# Patient Record
Sex: FEMALE | URBAN - METROPOLITAN AREA
[De-identification: names, ages, dates, MRNs, and addresses within clinical notes are randomized per-mention and may not be internally consistent; named-entity substitution may affect disease eponyms.]

---

## 2017-08-03 ENCOUNTER — INPATIENT (INPATIENT)
Facility: HOSPITAL | Age: 82
LOS: 0 days | Discharge: HOME | End: 2017-08-04
Attending: FAMILY MEDICINE | Admitting: FAMILY MEDICINE
Payer: MEDICARE

## 2017-08-03 VITALS — HEIGHT: 63 IN | WEIGHT: 134.92 LBS

## 2017-08-03 DIAGNOSIS — K27.9 PEPTIC ULCER, SITE UNSPECIFIED, UNSPECIFIED AS ACUTE OR CHRONIC, WITHOUT HEMORRHAGE OR PERFORATION: ICD-10-CM

## 2017-08-03 DIAGNOSIS — K92.2 GASTROINTESTINAL HEMORRHAGE, UNSPECIFIED: ICD-10-CM

## 2017-08-03 DIAGNOSIS — H40.9 UNSPECIFIED GLAUCOMA: ICD-10-CM

## 2017-08-03 DIAGNOSIS — N63 UNSPECIFIED LUMP IN BREAST: Chronic | ICD-10-CM

## 2017-08-03 DIAGNOSIS — N81.10 CYSTOCELE, UNSPECIFIED: ICD-10-CM

## 2017-08-03 DIAGNOSIS — Z98.890 OTHER SPECIFIED POSTPROCEDURAL STATES: Chronic | ICD-10-CM

## 2017-08-03 PROBLEM — Z00.00 ENCOUNTER FOR PREVENTIVE HEALTH EXAMINATION: Status: ACTIVE | Noted: 2017-08-03

## 2017-08-03 LAB
ABO RH CONFIRMATION: SIGNIFICANT CHANGE UP
ALBUMIN SERPL ELPH-MCNC: 3.1 G/DL — LOW (ref 3.3–5)
ALP SERPL-CCNC: 76 U/L — SIGNIFICANT CHANGE UP (ref 40–120)
ALT FLD-CCNC: 20 U/L — SIGNIFICANT CHANGE UP (ref 12–78)
ANION GAP SERPL CALC-SCNC: 7 MMOL/L — SIGNIFICANT CHANGE UP (ref 5–17)
ANISOCYTOSIS BLD QL: SLIGHT — SIGNIFICANT CHANGE UP
APPEARANCE UR: CLEAR — SIGNIFICANT CHANGE UP
AST SERPL-CCNC: 31 U/L — SIGNIFICANT CHANGE UP (ref 15–37)
BASOPHILS # BLD AUTO: 0.1 K/UL — SIGNIFICANT CHANGE UP (ref 0–0.2)
BASOPHILS NFR BLD AUTO: 2.5 % — HIGH (ref 0–2)
BILIRUB SERPL-MCNC: 0.5 MG/DL — SIGNIFICANT CHANGE UP (ref 0.2–1.2)
BILIRUB UR-MCNC: NEGATIVE — SIGNIFICANT CHANGE UP
BLD GP AB SCN SERPL QL: SIGNIFICANT CHANGE UP
BUN SERPL-MCNC: 19 MG/DL — SIGNIFICANT CHANGE UP (ref 7–23)
CALCIUM SERPL-MCNC: 8.9 MG/DL — SIGNIFICANT CHANGE UP (ref 8.5–10.1)
CHLORIDE SERPL-SCNC: 113 MMOL/L — HIGH (ref 96–108)
CO2 SERPL-SCNC: 23 MMOL/L — SIGNIFICANT CHANGE UP (ref 22–31)
COLOR SPEC: YELLOW — SIGNIFICANT CHANGE UP
CREAT SERPL-MCNC: 0.66 MG/DL — SIGNIFICANT CHANGE UP (ref 0.5–1.3)
DIFF PNL FLD: NEGATIVE — SIGNIFICANT CHANGE UP
EOSINOPHIL # BLD AUTO: 0.2 K/UL — SIGNIFICANT CHANGE UP (ref 0–0.5)
EOSINOPHIL NFR BLD AUTO: 4.2 % — SIGNIFICANT CHANGE UP (ref 0–6)
GLUCOSE SERPL-MCNC: 100 MG/DL — HIGH (ref 70–99)
GLUCOSE UR QL: NEGATIVE MG/DL — SIGNIFICANT CHANGE UP
HCT VFR BLD CALC: 31.4 % — LOW (ref 34.5–45)
HCT VFR BLD CALC: 32.1 % — LOW (ref 34.5–45)
HGB BLD-MCNC: 10.3 G/DL — LOW (ref 11.5–15.5)
HGB BLD-MCNC: 10.9 G/DL — LOW (ref 11.5–15.5)
INR BLD: 1.04 RATIO — SIGNIFICANT CHANGE UP (ref 0.88–1.16)
KETONES UR-MCNC: NEGATIVE — SIGNIFICANT CHANGE UP
LEUKOCYTE ESTERASE UR-ACNC: NEGATIVE — SIGNIFICANT CHANGE UP
LG PLATELETS BLD QL AUTO: SLIGHT — SIGNIFICANT CHANGE UP
LYMPHOCYTES # BLD AUTO: 1.9 K/UL — SIGNIFICANT CHANGE UP (ref 1–3.3)
LYMPHOCYTES # BLD AUTO: 39.6 % — SIGNIFICANT CHANGE UP (ref 13–44)
MAGNESIUM SERPL-MCNC: 2.1 MG/DL — SIGNIFICANT CHANGE UP (ref 1.6–2.6)
MANUAL DIF COMMENT BLD-IMP: SIGNIFICANT CHANGE UP
MCHC RBC-ENTMCNC: 24.4 PG — LOW (ref 27–34)
MCHC RBC-ENTMCNC: 32.7 GM/DL — SIGNIFICANT CHANGE UP (ref 32–36)
MCV RBC AUTO: 74.6 FL — LOW (ref 80–100)
MICROCYTES BLD QL: SLIGHT — SIGNIFICANT CHANGE UP
MONOCYTES # BLD AUTO: 0.5 K/UL — SIGNIFICANT CHANGE UP (ref 0–0.9)
MONOCYTES NFR BLD AUTO: 10.6 % — SIGNIFICANT CHANGE UP (ref 2–14)
NEUTROPHILS # BLD AUTO: 2.1 K/UL — SIGNIFICANT CHANGE UP (ref 1.8–7.4)
NEUTROPHILS NFR BLD AUTO: 43.2 % — SIGNIFICANT CHANGE UP (ref 43–77)
NITRITE UR-MCNC: NEGATIVE — SIGNIFICANT CHANGE UP
PH UR: 8 — SIGNIFICANT CHANGE UP (ref 5–8)
PLAT MORPH BLD: NORMAL — SIGNIFICANT CHANGE UP
PLATELET # BLD AUTO: 152 K/UL — SIGNIFICANT CHANGE UP (ref 150–400)
POTASSIUM SERPL-MCNC: 4.8 MMOL/L — SIGNIFICANT CHANGE UP (ref 3.5–5.3)
POTASSIUM SERPL-SCNC: 4.8 MMOL/L — SIGNIFICANT CHANGE UP (ref 3.5–5.3)
PROT SERPL-MCNC: 6.5 GM/DL — SIGNIFICANT CHANGE UP (ref 6–8.3)
PROT UR-MCNC: NEGATIVE MG/DL — SIGNIFICANT CHANGE UP
PROTHROM AB SERPL-ACNC: 11.2 SEC — SIGNIFICANT CHANGE UP (ref 9.8–12.7)
RBC # BLD: 4.22 M/UL — SIGNIFICANT CHANGE UP (ref 3.8–5.2)
RBC # FLD: 14.8 % — HIGH (ref 10.3–14.5)
RBC BLD AUTO: (no result)
SCHISTOCYTES BLD QL AUTO: SLIGHT — SIGNIFICANT CHANGE UP
SODIUM SERPL-SCNC: 143 MMOL/L — SIGNIFICANT CHANGE UP (ref 135–145)
SP GR SPEC: 1.01 — SIGNIFICANT CHANGE UP (ref 1.01–1.02)
SPHEROCYTES BLD QL SMEAR: SLIGHT — SIGNIFICANT CHANGE UP
TARGETS BLD QL SMEAR: SLIGHT — SIGNIFICANT CHANGE UP
TYPE + AB SCN PNL BLD: SIGNIFICANT CHANGE UP
UROBILINOGEN FLD QL: NEGATIVE MG/DL — SIGNIFICANT CHANGE UP
WBC # BLD: 4.9 K/UL — SIGNIFICANT CHANGE UP (ref 3.8–10.5)
WBC # FLD AUTO: 4.9 K/UL — SIGNIFICANT CHANGE UP (ref 3.8–10.5)

## 2017-08-03 PROCEDURE — 93010 ELECTROCARDIOGRAM REPORT: CPT

## 2017-08-03 PROCEDURE — 71010: CPT | Mod: 26

## 2017-08-03 PROCEDURE — 74177 CT ABD & PELVIS W/CONTRAST: CPT | Mod: 26

## 2017-08-03 PROCEDURE — 99285 EMERGENCY DEPT VISIT HI MDM: CPT

## 2017-08-03 RX ORDER — PANTOPRAZOLE SODIUM 20 MG/1
40 TABLET, DELAYED RELEASE ORAL
Qty: 0 | Refills: 0 | Status: DISCONTINUED | OUTPATIENT
Start: 2017-08-03 | End: 2017-08-04

## 2017-08-03 RX ORDER — DORZOLAMIDE HYDROCHLORIDE TIMOLOL MALEATE 20; 5 MG/ML; MG/ML
1 SOLUTION/ DROPS OPHTHALMIC
Qty: 0 | Refills: 0 | Status: DISCONTINUED | OUTPATIENT
Start: 2017-08-03 | End: 2017-08-04

## 2017-08-03 RX ORDER — ONDANSETRON 8 MG/1
4 TABLET, FILM COATED ORAL EVERY 6 HOURS
Qty: 0 | Refills: 0 | Status: DISCONTINUED | OUTPATIENT
Start: 2017-08-03 | End: 2017-08-04

## 2017-08-03 RX ORDER — ACETAMINOPHEN 500 MG
650 TABLET ORAL EVERY 6 HOURS
Qty: 0 | Refills: 0 | Status: DISCONTINUED | OUTPATIENT
Start: 2017-08-03 | End: 2017-08-04

## 2017-08-03 RX ORDER — AMLODIPINE BESYLATE 2.5 MG/1
5 TABLET ORAL DAILY
Qty: 0 | Refills: 0 | Status: DISCONTINUED | OUTPATIENT
Start: 2017-08-03 | End: 2017-08-04

## 2017-08-03 RX ORDER — PILOCARPINE HCL 4 %
1 DROPS OPHTHALMIC (EYE) THREE TIMES A DAY
Qty: 0 | Refills: 0 | Status: DISCONTINUED | OUTPATIENT
Start: 2017-08-03 | End: 2017-08-04

## 2017-08-03 RX ORDER — SODIUM CHLORIDE 9 MG/ML
1000 INJECTION INTRAMUSCULAR; INTRAVENOUS; SUBCUTANEOUS
Qty: 0 | Refills: 0 | Status: DISCONTINUED | OUTPATIENT
Start: 2017-08-03 | End: 2017-08-04

## 2017-08-03 RX ORDER — LATANOPROST 0.05 MG/ML
1 SOLUTION/ DROPS OPHTHALMIC; TOPICAL AT BEDTIME
Qty: 0 | Refills: 0 | Status: DISCONTINUED | OUTPATIENT
Start: 2017-08-03 | End: 2017-08-04

## 2017-08-03 RX ORDER — CALCIUM CARBONATE 500(1250)
1 TABLET ORAL AT BEDTIME
Qty: 0 | Refills: 0 | Status: DISCONTINUED | OUTPATIENT
Start: 2017-08-03 | End: 2017-08-04

## 2017-08-03 RX ADMIN — SODIUM CHLORIDE 75 MILLILITER(S): 9 INJECTION INTRAMUSCULAR; INTRAVENOUS; SUBCUTANEOUS at 18:02

## 2017-08-03 RX ADMIN — PANTOPRAZOLE SODIUM 40 MILLIGRAM(S): 20 TABLET, DELAYED RELEASE ORAL at 16:05

## 2017-08-03 RX ADMIN — PANTOPRAZOLE SODIUM 40 MILLIGRAM(S): 20 TABLET, DELAYED RELEASE ORAL at 18:02

## 2017-08-03 RX ADMIN — LATANOPROST 1 DROP(S): 0.05 SOLUTION/ DROPS OPHTHALMIC; TOPICAL at 21:00

## 2017-08-03 RX ADMIN — AMLODIPINE BESYLATE 5 MILLIGRAM(S): 2.5 TABLET ORAL at 15:55

## 2017-08-03 NOTE — ED PROVIDER NOTE - GASTROINTESTINAL NEGATIVE STATEMENT, MLM
+ abdominal pain, +blood in stool, no bloating, no constipation, no diarrhea, no nausea and no vomiting.

## 2017-08-03 NOTE — H&P ADULT - ASSESSMENT
81 yo female with PMH of remote PUD 35 years ago, Glaucoma, OP presents with 1 episode of of rectal bleeding this am.  Patient reports normal BM , but in during last week she has had more lose BM, but no melena, no blood, some nonspecific abdominal pain. She had a normal US abdomen 6 months ago.  As per patient she has had large amount of blood in her stool, about a  cup.  Denies prior  episodes like this, remote h/o colonoscopy and upper endoscopy. Denies use of antiplatelet, or anticoagulant medication, NSAIDs, but take OTC - fish oil, gingko biloba - which both can increase risk of bleeding .  Denies dizziness, lightheadedness, SOB, chest pain, weakness, fatigue, or any other symptoms, reports occasional dry cough at night, sinusitis.    In Ed - T(F): 97.7  98.1, HR: 60 (60 - 68), /77 ( 166/77 - 187/91), RR: 16 , SpO2: 99% (99% - 100%), Hb 10.3, UA - neg  CT abd - negative for acute pathology

## 2017-08-03 NOTE — H&P ADULT - PROBLEM SELECTOR PLAN 1
med- surg stable  diet, IV fluids  IV PPI  h/h q 8h monitoring  stop fish oil and Gingko biloba - both can increase risk of bleeding  GI consult for colonoscopy

## 2017-08-03 NOTE — ED PROVIDER NOTE - OBJECTIVE STATEMENT
83 yo F hx of peptic ulcer remotely, presents with CC of rectal bleeding.  Pt has had some nonspecific abdominal pain, over the last few months.  States had a normal US abdomen 6 months ago.  Was supposed to be schedule for colonoscopy with Dr. Verdin 4 months ago.  This morning had one episode of bright red blood per rectum, approximately a cup full according to her and daughter.  Denies previous occurrence of this.  Denies use of antiplatelet, or anticoagulant medication.  Denies dizziness, lightheadedness, SOB, chest pain, weakness, fatigue, or any other symptoms.  No other concerns.

## 2017-08-03 NOTE — H&P ADULT - NSHPLABSRESULTS_GEN_ALL_CORE
143  |  113<H>  |  19  ----------------------------<  100<H>  4.8   |  23  |  0.66    Ca    8.9      03 Aug 2017 08:34  Mg     2.1         TPro  6.5  /  Alb  3.1<L>  /  TBili  0.5  /  DBili  x   /  AST  31  /  ALT  20  /  AlkPhos  76                              10.3   4.9   )-----------( 152      ( 03 Aug 2017 08:34 )             31.4             LIVER FUNCTIONS - ( 03 Aug 2017 08:34 )  Alb: 3.1 g/dL / Pro: 6.5 gm/dL / ALK PHOS: 76 U/L / ALT: 20 U/L / AST: 31 U/L / GGT: x             PT/INR - ( 03 Aug 2017 08:34 )   PT: 11.2 sec;   INR: 1.04 ratio             Urinalysis Basic - ( 03 Aug 2017 10:11 )    Color: Yellow / Appearance: Clear / S.010 / pH: x  Gluc: x / Ketone: Negative  / Bili: Negative / Urobili: Negative mg/dL   Blood: x / Protein: Negative mg/dL / Nitrite: Negative   Leuk Esterase: Negative / RBC: x / WBC x   Sq Epi: x / Non Sq Epi: x / Bacteria: x    < from: CT Abdomen and Pelvis w/ IV Cont (17 @ 10:02) >    LOWER CHEST:Within normal limits.    LIVER: Within normal limits.  BILE DUCTS: Normal caliber.  GALLBLADDER: Within normal limits.  SPLEEN: Within normal limits.  PANCREAS: Within normal limits.  ADRENALS: Within normal limits.  KIDNEYS/URETERS: Within normal limits.    BLADDER: Within normal limits.  REPRODUCTIVE ORGANS: A pessary is noted in the vagina. No adnexal mass.    BOWEL: Colonic diverticulosis without evidence of acute diverticulitis.   No bowel obstruction. Appendix is not visualized. No secondary signs of   appendicitis.  PERITONEUM: No ascites.  VESSELS:  Within normal limits.  RETROPERITONEUM: No lymphadenopathy.    ABDOMINAL WALL: Within normal limits.  BONES: Within normal limits.    IMPRESSION: Etiology of the patient's abdominal pain is not evident.    < end of copied text >

## 2017-08-03 NOTE — CONSULT NOTE ADULT - ASSESSMENT
81yo female with rectal bleeding  differential includes hemorrhoids, diverticulosis, malignancy, colitis  serial cbc and monitor for recurrent bleeding  if no further bleeding and tolerates po, would plan for d/c and outpt colonoscopy within 1-2 weeks  if recurrent bleeding would plan for colonoscopy as inpt  d/w pt and family in detail

## 2017-08-03 NOTE — CONSULT NOTE ADULT - SUBJECTIVE AND OBJECTIVE BOX
Patient is a 82y old  Female who presents with a chief complaint of rectal bleeding (03 Aug 2017 15:04)      HPI:  81 yo female with PMH of remote PUD 35 years ago, Glaucoma, OP presents with 1 episode of of rectal bleeding this am.  Patient reports normal BM , but in during last week she has had more lose BM, but no melena, no blood, some nonspecific abdominal pain. She had a normal US abdomen 6 months ago.  As per patient she has had large amount of blood in her stool, about a  cup.  Denies prior  episodes like this, remote h/o colonoscopy and upper endoscopy. Denies use of antiplatelet, or anticoagulant medication, NSAIDs, but take OTC - fish oil, gingko biloba. Denies dizziness, lightheadedness, SOB, chest pain, weakness, fatigue, or any other symptoms, reports occasional dry cough at night, sinusitis.  Pt currently eating dinner without issue.  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 1000 milliLiter(s) (75 mL/Hr) IV Continuous <Continuous>  pantoprazole  Injectable 40 milliGRAM(s) IV Push two times a day  calcium carbonate 500 mG (Tums) Chewable 1 Tablet(s) Chew at bedtime  dorzolamide 2%/timolol 0.5% Ophthalmic Solution 1 Drop(s) Both EYES two times a day  latanoprost 0.005% Ophthalmic Solution 1 Drop(s) Both EYES at bedtime  pilocarpine 4% Solution 1 Drop(s) Both EYES three times a day  multivitamin 1 Tablet(s) Oral daily  amLODIPine   Tablet 5 milliGRAM(s) Oral daily    MEDICATIONS  (PRN):  acetaminophen   Tablet 650 milliGRAM(s) Oral every 6 hours PRN For Temp greater than 38 C (100.4 F), mild pain  ondansetron Injectable 4 milliGRAM(s) IV Push every 6 hours PRN Nausea    Allergies    No Known Allergies    SOCIAL HISTORY: lives at home, no sig etoh    FAMILY HISTORY:  Family history of heart disease (Mother)      REVIEW OF SYSTEMS:    CONSTITUTIONAL: No weakness, fevers or chills  EYES/ENT: No visual changes;  No vertigo or throat pain   NECK: No pain or stiffness  RESPIRATORY: No cough, wheezing, hemoptysis; No shortness of breath  CARDIOVASCULAR: No chest pain or palpitations  GASTROINTESTINAL: rectal bleeding as above  GENITOURINARY: No dysuria, frequency or hematuria  NEUROLOGICAL: No numbness or weakness  SKIN: No itching, burning, rashes, or lesions   PSYCH: Normal mood and affect  All other review of systems is negative unless indicated above.    Vital Signs Last 24 Hrs  T(C): 36.5 (03 Aug 2017 16:40), Max: 36.7 (03 Aug 2017 08:08)  T(F): 97.7 (03 Aug 2017 16:40), Max: 98.1 (03 Aug 2017 08:08)  HR: 56 (03 Aug 2017 16:40) (56 - 80)  BP: 195/73 (03 Aug 2017 16:40) (166/77 - 195/73)  BP(mean): --  RR: 16 (03 Aug 2017 16:40) (16 - 17)  SpO2: 100% (03 Aug 2017 16:40) (99% - 100%)    PHYSICAL EXAM:    Constitutional: NAD, well-developed  HEENT: MMM  Neck: No LAD  Respiratory: CTAB  Cardiovascular: S1 and S2, RRR, no M/G/R  Gastrointestinal: BS+, soft, NT/ND  Extremities: No peripheral edema  Vascular: 2+ peripheral pulses  Neurological: A/O x 3, no focal deficits  Psychiatric: Normal mood, normal affect  Skin: No rashes    LABS:                        10.9   x     )-----------( x        ( 03 Aug 2017 16:17 )             32.1     08-03    143  |  113<H>  |  19  ----------------------------<  100<H>  4.8   |  23  |  0.66    Ca    8.9      03 Aug 2017 08:34  Mg     2.1     08-03    TPro  6.5  /  Alb  3.1<L>  /  TBili  0.5  /  DBili  x   /  AST  31  /  ALT  20  /  AlkPhos  76  08-03    PT/INR - ( 03 Aug 2017 08:34 )   PT: 11.2 sec;   INR: 1.04 ratio           LIVER FUNCTIONS - ( 03 Aug 2017 08:34 )  Alb: 3.1 g/dL / Pro: 6.5 gm/dL / ALK PHOS: 76 U/L / ALT: 20 U/L / AST: 31 U/L / GGT: x             RADIOLOGY & ADDITIONAL STUDIES:

## 2017-08-03 NOTE — H&P ADULT - NSHPREVIEWOFSYSTEMS_GEN_ALL_CORE
REVIEW OF SYSTEMS:    CONSTITUTIONAL: No weakness, fevers or chills  EYES/ENT: No visual changes;  No vertigo or throat pain   NECK: No pain or stiffness  RESPIRATORY: No cough, wheezing, hemoptysis; No shortness of breath  CARDIOVASCULAR: No chest pain or palpitations  GASTROINTESTINAL: No abdominal or epigastric pain. No nausea, vomiting, or hematemesis; No diarrhea or constipation. No melena +  hematochezia.  GENITOURINARY: No dysuria, frequency or hematuria  NEUROLOGICAL: No numbness or weakness  SKIN: No itching, burning, rashes, or lesions   All other review of systems is negative unless indicated above.

## 2017-08-03 NOTE — ED ADULT NURSE NOTE - OBJECTIVE STATEMENT
Pt presents to ED c/o BRBPR noted in toilet. Pt denies SOB. Pt reports feeling "a little tired and lightheaded yesterday but it didn't stop me from doing what I have to do"

## 2017-08-03 NOTE — ED PROVIDER NOTE - GASTROINTESTINAL, MLM
Abdomen soft, mild TTP RLQ, no guarding.  Stool light brown, but guaiac positive Lot #[id number], 2 external hemorrhoids nonthrombosed nonbleeding.

## 2017-08-03 NOTE — ED PROVIDER NOTE - MEDICAL DECISION MAKING DETAILS
Stool brown, with red mixed, guaiac positive.  CT abdomen WNL.  Hgb 10.3.  Hemodynamically stable.  Spoke with Dr. Mae, agrees with admission, serial CBC's, and will see as inpatient for likely lower Gi bleed.

## 2017-08-04 ENCOUNTER — TRANSCRIPTION ENCOUNTER (OUTPATIENT)
Age: 82
End: 2017-08-04

## 2017-08-04 VITALS
OXYGEN SATURATION: 100 % | TEMPERATURE: 98 F | SYSTOLIC BLOOD PRESSURE: 134 MMHG | RESPIRATION RATE: 18 BRPM | DIASTOLIC BLOOD PRESSURE: 57 MMHG | HEART RATE: 64 BPM

## 2017-08-04 LAB
ANION GAP SERPL CALC-SCNC: 7 MMOL/L — SIGNIFICANT CHANGE UP (ref 5–17)
ANISOCYTOSIS BLD QL: SLIGHT — SIGNIFICANT CHANGE UP
BASOPHILS # BLD AUTO: 0.1 K/UL — SIGNIFICANT CHANGE UP (ref 0–0.2)
BASOPHILS NFR BLD AUTO: 2.7 % — HIGH (ref 0–2)
BUN SERPL-MCNC: 14 MG/DL — SIGNIFICANT CHANGE UP (ref 7–23)
CALCIUM SERPL-MCNC: 8.9 MG/DL — SIGNIFICANT CHANGE UP (ref 8.5–10.1)
CHLORIDE SERPL-SCNC: 112 MMOL/L — HIGH (ref 96–108)
CO2 SERPL-SCNC: 25 MMOL/L — SIGNIFICANT CHANGE UP (ref 22–31)
CREAT SERPL-MCNC: 0.49 MG/DL — LOW (ref 0.5–1.3)
EOSINOPHIL # BLD AUTO: 0.2 K/UL — SIGNIFICANT CHANGE UP (ref 0–0.5)
EOSINOPHIL NFR BLD AUTO: 4.6 % — SIGNIFICANT CHANGE UP (ref 0–6)
GLUCOSE SERPL-MCNC: 88 MG/DL — SIGNIFICANT CHANGE UP (ref 70–99)
HBA1C BLD-MCNC: 5.4 % — SIGNIFICANT CHANGE UP (ref 4–5.6)
HCT VFR BLD CALC: 31.2 % — LOW (ref 34.5–45)
HCT VFR BLD CALC: 31.4 % — LOW (ref 34.5–45)
HGB BLD-MCNC: 10.5 G/DL — LOW (ref 11.5–15.5)
HGB BLD-MCNC: 10.8 G/DL — LOW (ref 11.5–15.5)
LG PLATELETS BLD QL AUTO: SLIGHT — SIGNIFICANT CHANGE UP
LYMPHOCYTES # BLD AUTO: 2 K/UL — SIGNIFICANT CHANGE UP (ref 1–3.3)
LYMPHOCYTES # BLD AUTO: 41 % — SIGNIFICANT CHANGE UP (ref 13–44)
MAGNESIUM SERPL-MCNC: 2 MG/DL — SIGNIFICANT CHANGE UP (ref 1.6–2.6)
MANUAL DIF COMMENT BLD-IMP: SIGNIFICANT CHANGE UP
MCHC RBC-ENTMCNC: 25.3 PG — LOW (ref 27–34)
MCHC RBC-ENTMCNC: 33.6 GM/DL — SIGNIFICANT CHANGE UP (ref 32–36)
MCV RBC AUTO: 75.1 FL — LOW (ref 80–100)
MICROCYTES BLD QL: SLIGHT — SIGNIFICANT CHANGE UP
MONOCYTES # BLD AUTO: 0.4 K/UL — SIGNIFICANT CHANGE UP (ref 0–0.9)
MONOCYTES NFR BLD AUTO: 8.9 % — SIGNIFICANT CHANGE UP (ref 2–14)
NEUTROPHILS # BLD AUTO: 2.1 K/UL — SIGNIFICANT CHANGE UP (ref 1.8–7.4)
NEUTROPHILS NFR BLD AUTO: 42.9 % — LOW (ref 43–77)
PHOSPHATE SERPL-MCNC: 3.1 MG/DL — SIGNIFICANT CHANGE UP (ref 2.5–4.5)
PLAT MORPH BLD: NORMAL — SIGNIFICANT CHANGE UP
PLATELET # BLD AUTO: 151 K/UL — SIGNIFICANT CHANGE UP (ref 150–400)
PLATELET COUNT - ESTIMATE: NORMAL — SIGNIFICANT CHANGE UP
POTASSIUM SERPL-MCNC: 3.8 MMOL/L — SIGNIFICANT CHANGE UP (ref 3.5–5.3)
POTASSIUM SERPL-SCNC: 3.8 MMOL/L — SIGNIFICANT CHANGE UP (ref 3.5–5.3)
RBC # BLD: 4.15 M/UL — SIGNIFICANT CHANGE UP (ref 3.8–5.2)
RBC # FLD: 14.9 % — HIGH (ref 10.3–14.5)
RBC BLD AUTO: (no result)
SODIUM SERPL-SCNC: 144 MMOL/L — SIGNIFICANT CHANGE UP (ref 135–145)
TARGETS BLD QL SMEAR: SLIGHT — SIGNIFICANT CHANGE UP
TSH SERPL-MCNC: 1.97 UU/ML — SIGNIFICANT CHANGE UP (ref 0.36–3.74)
WBC # BLD: 4.9 K/UL — SIGNIFICANT CHANGE UP (ref 3.8–10.5)
WBC # FLD AUTO: 4.9 K/UL — SIGNIFICANT CHANGE UP (ref 3.8–10.5)

## 2017-08-04 RX ORDER — AMLODIPINE BESYLATE 2.5 MG/1
1 TABLET ORAL
Qty: 30 | Refills: 0 | OUTPATIENT
Start: 2017-08-04 | End: 2017-09-03

## 2017-08-04 RX ORDER — OMEGA-3 ACID ETHYL ESTERS 1 G
0 CAPSULE ORAL
Qty: 0 | Refills: 0 | COMMUNITY

## 2017-08-04 RX ORDER — ACETAMINOPHEN 500 MG
2 TABLET ORAL
Qty: 0 | Refills: 0 | COMMUNITY
Start: 2017-08-04

## 2017-08-04 RX ORDER — PANTOPRAZOLE SODIUM 20 MG/1
1 TABLET, DELAYED RELEASE ORAL
Qty: 30 | Refills: 0 | OUTPATIENT
Start: 2017-08-04 | End: 2017-09-03

## 2017-08-04 RX ORDER — GINKGO BILOBA 40 MG
0 CAPSULE ORAL
Qty: 0 | Refills: 0 | COMMUNITY

## 2017-08-04 RX ADMIN — AMLODIPINE BESYLATE 5 MILLIGRAM(S): 2.5 TABLET ORAL at 05:37

## 2017-08-04 RX ADMIN — SODIUM CHLORIDE 75 MILLILITER(S): 9 INJECTION INTRAMUSCULAR; INTRAVENOUS; SUBCUTANEOUS at 05:43

## 2017-08-04 RX ADMIN — PANTOPRAZOLE SODIUM 40 MILLIGRAM(S): 20 TABLET, DELAYED RELEASE ORAL at 05:40

## 2017-08-04 RX ADMIN — DORZOLAMIDE HYDROCHLORIDE TIMOLOL MALEATE 1 DROP(S): 20; 5 SOLUTION/ DROPS OPHTHALMIC at 05:38

## 2017-08-04 RX ADMIN — Medication 1 DROP(S): at 06:34

## 2017-08-04 RX ADMIN — Medication 1 TABLET(S): at 12:01

## 2017-08-04 NOTE — DISCHARGE NOTE ADULT - CARE PROVIDER_API CALL
Todd Mae), Gastroenterology; Internal Medicine  [address] [place] [address]  Phone: [phone]  Fax: [phone]    YOUR PCP IN THE COMMUNITY,   Please call your PCP to make an appointment  Phone: (   )    -  Fax: (   )    -

## 2017-08-04 NOTE — DISCHARGE NOTE ADULT - CARE PLAN
Principal Discharge DX:	Lower GI bleed  Goal:	symptom free  Instructions for follow-up, activity and diet:	f/u with Dr Mae as a outpatient - please call to make an appointment for nexst week- you will need an outpatient colonoscopy.  AVOID NSAID's like advil. aleve, motrin- take tylenol for pain  Repeat lab works CBC in 1-2 weeks  Secondary Diagnosis:	Other secondary hypertension  Goal:	prevent worsening  Instructions for follow-up, activity and diet:	you were started on oral antihypertensive while you were in the hospital- Norvasc 5mg daily. Please take your BP at home at least once a day and keep a log - bring the log with you on your next MD appointment.  Secondary Diagnosis:	Glaucoma, unspecified glaucoma type, unspecified laterality  Instructions for follow-up, activity and diet:	continue eye drops

## 2017-08-04 NOTE — PROGRESS NOTE ADULT - PROBLEM SELECTOR PLAN 1
DDx: include hemorrhoids vs AVMS  Acute blood loss anemia suspected from Acute GIB  Avoid nsaids   planned for outpt colosncopy

## 2017-08-04 NOTE — PROGRESS NOTE ADULT - SUBJECTIVE AND OBJECTIVE BOX
83 yo female with PMH of remote PUD 35 years ago, Glaucoma, OP presents with 1 episode of of rectal bleeding this am.  Patient reports normal BM , but in during last week she has had more lose BM, but no melena, no blood, some nonspecific abdominal pain. She had a normal US abdomen 6 months ago.  As per patient she has had large amount of blood in her stool, about a  cup.  Denies prior  episodes like this, remote h/o colonoscopy and upper endoscopy. Denies use of antiplatelet, or anticoagulant medication, NSAIDs, but take OTC - fish oil, gingko biloba - which both can increase risk of bleeding .  Denies dizziness, lightheadedness, SOB, chest pain, weakness, fatigue, or any other symptoms, reports occasional dry cough at night, sinusitis.    H/H stable for > 24 hours. denies abd pain. CT nega for acute Intraabd pathology. GI recommended outpt colonscopy. Avoid NSAIDS. D/W family at length      REVIEW OF SYSTEMS:    CONSTITUTIONAL: No weakness, fevers or chills  EYES/ENT: No visual changes;  No vertigo or throat pain   NECK: No pain or stiffness  RESPIRATORY: No cough, wheezing, hemoptysis; No shortness of breath  CARDIOVASCULAR: No chest pain or palpitations  GASTROINTESTINAL: No abdominal or epigastric pain. No nausea, vomiting, or hematemesis; No diarrhea or constipation. No melena or hematochezia.  GENITOURINARY: No dysuria, frequency or hematuria  NEUROLOGICAL: No numbness or weakness  SKIN: No itching, burning, rashes, or lesions   All other review of systems is negative unless indicated above        Vital Signs Last 24 Hrs  T(C): 36.7 (04 Aug 2017 11:38), Max: 36.8 (03 Aug 2017 21:14)  T(F): 98.1 (04 Aug 2017 11:38), Max: 98.2 (03 Aug 2017 21:14)  HR: 64 (04 Aug 2017 11:38) (56 - 80)  BP: 134/57 (04 Aug 2017 11:38) (126/56 - 195/73)  BP(mean): --  RR: 18 (04 Aug 2017 11:38) (16 - 18)  SpO2: 100% (04 Aug 2017 11:38) (98% - 100%)    I&O's Summary      CAPILLARY BLOOD GLUCOSE          PHYSICAL EXAM:    Constitutional: NAD, awake and alert, well-developed  HEENT: PERR, EOMI, Normal Hearing, MMM  Neck: Soft and supple, No LAD, No JVD  Respiratory: Breath sounds are clear bilaterally, No wheezing, rales or rhonchi  Cardiovascular: S1 and S2, regular rate and rhythm, no Murmurs, gallops or rubs  Gastrointestinal: Bowel Sounds present, soft, nontender, nondistended, no guarding, no rebound  Extremities: No peripheral edema  Vascular: 2+ peripheral pulses  Neurological: A/O x 3, no focal deficits  Musculoskeletal: 5/5 strength b/l upper and lower extremities  Skin: No rashes    MEDICATIONS:  MEDICATIONS  (STANDING):  sodium chloride 0.9%. 1000 milliLiter(s) (75 mL/Hr) IV Continuous <Continuous>  pantoprazole  Injectable 40 milliGRAM(s) IV Push two times a day  calcium carbonate 500 mG (Tums) Chewable 1 Tablet(s) Chew at bedtime  dorzolamide 2%/timolol 0.5% Ophthalmic Solution 1 Drop(s) Both EYES two times a day  latanoprost 0.005% Ophthalmic Solution 1 Drop(s) Both EYES at bedtime  pilocarpine 4% Solution 1 Drop(s) Both EYES three times a day  multivitamin 1 Tablet(s) Oral daily  amLODIPine   Tablet 5 milliGRAM(s) Oral daily      LABS: All Labs Reviewed:                        10.5   4.9   )-----------( 151      ( 04 Aug 2017 07:13 )             31.2     08-04    144  |  112<H>  |  14  ----------------------------<  88  3.8   |  25  |  0.49<L>    Ca    8.9      04 Aug 2017 07:13  Phos  3.1     08-04  Mg     2.0     08-04    TPro  6.5  /  Alb  3.1<L>  /  TBili  0.5  /  DBili  x   /  AST  31  /  ALT  20  /  AlkPhos  76  08-03    PT/INR - ( 03 Aug 2017 08:34 )   PT: 11.2 sec;   INR: 1.04 ratio                   Blood Culture:     RADIOLOGY/EKG:    Total D/C time > 30 min  Will leave message for Dr Verdin for follow up

## 2017-08-04 NOTE — DISCHARGE NOTE ADULT - PROVIDER TOKENS
TOKEN:'[id number]',FREE:[LAST:[YOUR PCP IN THE COMMUNITY],PHONE:[(   )    -],FAX:[(   )    -],ADDRESS:[Please call your PCP to make an appointment]]

## 2017-08-04 NOTE — DISCHARGE NOTE ADULT - HOSPITAL COURSE
83 yo female with PMH of remote PUD 35 years ago, Glaucoma, OP presents with 1 episode of of rectal bleeding.  Patient reports normal BM , but inlast week she has had more lose BM, but no melena, no blood, some nonspecific abdominal pain. She had a normal US abdomen 6 months ago.  As per patient she has had large amount of blood in her stool, about a  cup.  Denies prior  episodes like this, remote h/o colonoscopy and upper endoscopy. Denies use of antiplatelet, or anticoagulant medication, NSAIDs, but take OTC - fish oil, gingko biloba - which both can increase risk of bleeding .    CT abd - negative for acute pathology. H/H stable during this hospitalization- GI consult with recommendations for outpatient colonoscopy.       Problem/Plan - 1:  ·  Problem: Lower GI bleed.  Plan: med- surg stable  diet, IV fluids  IV PPI- switch to po on discharge  h/h stable  stop fish oil and Gingko biloba - both can increase risk of bleeding  GI consult - with recommendations for outpatient colonoscopy    Problem/Plan - 2:  ·  Problem: PUD (peptic ulcer disease).  Plan: continue oral protonix    Problem/Plan - 3:  ·  Problem: R/O Elevated BP without diagnosis of hypertension.  Plan: noted SBP > 160   monitor   continue norvasc po 5mg     Problem/Plan - 4:  ·  Problem: Glaucoma.  Plan: c/w eye drops.     Problem/Plan - 5:  ·  Problem: Bladder prolapse, female, acquired.  Plan: 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.     Plan for discharge home on oral antihypertensive and recommendations to f/u with Dr Mae for outpatient colonoscopy.

## 2017-08-04 NOTE — DISCHARGE NOTE ADULT - MEDICATION SUMMARY - MEDICATIONS TO STOP TAKING
I will STOP taking the medications listed below when I get home from the hospital:    Gingko Biloba oral tablet  --  by mouth    Fish Oil oral capsule  --  by mouth

## 2017-08-04 NOTE — DISCHARGE NOTE ADULT - PATIENT PORTAL LINK FT
“You can access the [employer] Patient Portal, offered by [facility], by registering with the following website: [email or web address]”

## 2017-08-04 NOTE — DISCHARGE NOTE ADULT - PLAN OF CARE
symptom free f/u with Dr Mae as a outpatient - please call to make an appointment for nexst week- you will need an outpatient colonoscopy.  AVOID NSAID's like advil. aleve, motrin- take tylenol for pain  Repeat lab works CBC in 1-2 weeks prevent worsening you were started on oral antihypertensive while you were in the hospital- Norvasc 5mg daily. Please take your BP at home at least once a day and keep a log - bring the log with you on your next MD appointment. continue eye drops

## 2017-08-04 NOTE — DISCHARGE NOTE ADULT - MEDICATION SUMMARY - MEDICATIONS TO TAKE
I will START or STAY ON the medications listed below when I get home from the hospital:    acetaminophen 325 mg oral tablet  -- 2 tab(s) by mouth every 6 hours, As needed, For Temp greater than 38 C (100.4 F), mild pain  -- Indication: For Pain    amLODIPine 5 mg oral tablet  -- 1 tab(s) by mouth once a day  -- Indication: For Hypertension    calcium (as calcium citrate) 250 mg oral tablet  -- 1 tab(s) by mouth once a day  -- Indication: For supplement    pilocarpine 4% ophthalmic solution  -- 1 drop(s) in each eye 3 times a day  -- Indication: For Glaucoma    dorzolamide-timolol 2.23%-0.68% ophthalmic solution  -- 1 drop(s) in each eye 2 times a day  -- Indication: For Glaucoma    latanoprost 0.005% ophthalmic solution  -- 1 drop(s) in each eye once a day (in the evening)  -- Indication: For Glaucoma    pantoprazole 40 mg oral delayed release tablet  -- 1 tab(s) by mouth once a day  -- It is very important that you take or use this exactly as directed.  Do not skip doses or discontinue unless directed by your doctor.  Obtain medical advice before taking any non-prescription drugs as some may affect the action of this medication.  Swallow whole.  Do not crush.    -- Indication: For Lower GI bleed    multivitamin  --     -- Indication: For supplement    Eye Health Formula oral capsule  -- 1 cap(s) by mouth once a day  -- Indication: For Eye drops    biotin 10 mg oral tablet  -- 1 tab(s) by mouth once a day  -- Indication: For supplement

## 2017-08-04 NOTE — PROGRESS NOTE ADULT - ASSESSMENT
83 yo female with PMH of remote PUD 35 years ago, Glaucoma, OP presents with 1 episode of of rectal bleeding this am.  Patient reports normal BM , but in during last week she has had more lose BM, but no melena, no blood, some nonspecific abdominal pain. She had a normal US abdomen 6 months ago.  As per patient she has had large amount of blood in her stool, about a  cup.  Denies prior  episodes like this, remote h/o colonoscopy and upper endoscopy. Denies use of antiplatelet, or anticoagulant medication, NSAIDs, but take OTC - fish oil, gingko biloba - which both can increase risk of bleeding .  Denies dizziness, lightheadedness, SOB, chest pain, weakness, fatigue, or any other symptoms, reports occasional dry cough at night, sinusitis.

## 2017-08-08 DIAGNOSIS — K92.2 GASTROINTESTINAL HEMORRHAGE, UNSPECIFIED: ICD-10-CM

## 2017-08-08 DIAGNOSIS — H40.9 UNSPECIFIED GLAUCOMA: ICD-10-CM

## 2017-08-08 DIAGNOSIS — N81.10 CYSTOCELE, UNSPECIFIED: ICD-10-CM

## 2017-08-21 ENCOUNTER — RESULT REVIEW (OUTPATIENT)
Age: 82
End: 2017-08-21

## 2017-09-13 ENCOUNTER — RESULT REVIEW (OUTPATIENT)
Age: 82
End: 2017-09-13

## 2017-09-18 ENCOUNTER — OUTPATIENT (OUTPATIENT)
Dept: OUTPATIENT SERVICES | Facility: HOSPITAL | Age: 82
LOS: 1 days | Discharge: HOME | End: 2017-09-18

## 2017-09-18 VITALS
WEIGHT: 134.92 LBS | HEIGHT: 63 IN | OXYGEN SATURATION: 98 % | TEMPERATURE: 98 F | DIASTOLIC BLOOD PRESSURE: 102 MMHG | RESPIRATION RATE: 14 BRPM | HEART RATE: 68 BPM | SYSTOLIC BLOOD PRESSURE: 197 MMHG

## 2017-09-18 DIAGNOSIS — Z98.890 OTHER SPECIFIED POSTPROCEDURAL STATES: Chronic | ICD-10-CM

## 2017-09-18 DIAGNOSIS — N63 UNSPECIFIED LUMP IN BREAST: Chronic | ICD-10-CM

## 2017-09-18 DIAGNOSIS — K63.5 POLYP OF COLON: ICD-10-CM

## 2017-09-18 DIAGNOSIS — Z98.49 CATARACT EXTRACTION STATUS, UNSPECIFIED EYE: Chronic | ICD-10-CM

## 2017-09-18 DIAGNOSIS — R93.3 ABNORMAL FINDINGS ON DIAGNOSTIC IMAGING OF OTHER PARTS OF DIGESTIVE TRACT: Chronic | ICD-10-CM

## 2017-09-18 DIAGNOSIS — C18.9 MALIGNANT NEOPLASM OF COLON, UNSPECIFIED: ICD-10-CM

## 2017-09-18 LAB
ANION GAP SERPL CALC-SCNC: 5 MMOL/L — SIGNIFICANT CHANGE UP (ref 5–17)
APTT BLD: 33 SEC — SIGNIFICANT CHANGE UP (ref 27.5–37.4)
BASOPHILS # BLD AUTO: 0.2 K/UL — SIGNIFICANT CHANGE UP (ref 0–0.2)
BASOPHILS NFR BLD AUTO: 2.7 % — HIGH (ref 0–2)
BUN SERPL-MCNC: 11 MG/DL — SIGNIFICANT CHANGE UP (ref 7–23)
CALCIUM SERPL-MCNC: 8.9 MG/DL — SIGNIFICANT CHANGE UP (ref 8.5–10.1)
CEA SERPL-MCNC: 6.5 NG/ML — HIGH (ref 0–3.8)
CHLORIDE SERPL-SCNC: 110 MMOL/L — HIGH (ref 96–108)
CO2 SERPL-SCNC: 27 MMOL/L — SIGNIFICANT CHANGE UP (ref 22–31)
CREAT SERPL-MCNC: 0.53 MG/DL — SIGNIFICANT CHANGE UP (ref 0.5–1.3)
EOSINOPHIL # BLD AUTO: 0.3 K/UL — SIGNIFICANT CHANGE UP (ref 0–0.5)
EOSINOPHIL NFR BLD AUTO: 5.6 % — SIGNIFICANT CHANGE UP (ref 0–6)
GLUCOSE SERPL-MCNC: 85 MG/DL — SIGNIFICANT CHANGE UP (ref 70–99)
HBA1C BLD-MCNC: 5.1 % — SIGNIFICANT CHANGE UP (ref 4–5.6)
HCT VFR BLD CALC: 34.8 % — SIGNIFICANT CHANGE UP (ref 34.5–45)
HGB BLD-MCNC: 11.5 G/DL — SIGNIFICANT CHANGE UP (ref 11.5–15.5)
INR BLD: 1.05 RATIO — SIGNIFICANT CHANGE UP (ref 0.88–1.16)
LYMPHOCYTES # BLD AUTO: 2.6 K/UL — SIGNIFICANT CHANGE UP (ref 1–3.3)
LYMPHOCYTES # BLD AUTO: 45.7 % — HIGH (ref 13–44)
MCHC RBC-ENTMCNC: 26.2 PG — LOW (ref 27–34)
MCHC RBC-ENTMCNC: 33.1 GM/DL — SIGNIFICANT CHANGE UP (ref 32–36)
MCV RBC AUTO: 79.3 FL — LOW (ref 80–100)
MONOCYTES # BLD AUTO: 0.5 K/UL — SIGNIFICANT CHANGE UP (ref 0–0.9)
MONOCYTES NFR BLD AUTO: 7.9 % — SIGNIFICANT CHANGE UP (ref 2–14)
NEUTROPHILS # BLD AUTO: 2.2 K/UL — SIGNIFICANT CHANGE UP (ref 1.8–7.4)
NEUTROPHILS NFR BLD AUTO: 38 % — LOW (ref 43–77)
PLATELET # BLD AUTO: 174 K/UL — SIGNIFICANT CHANGE UP (ref 150–400)
POTASSIUM SERPL-MCNC: 3.6 MMOL/L — SIGNIFICANT CHANGE UP (ref 3.5–5.3)
POTASSIUM SERPL-SCNC: 3.6 MMOL/L — SIGNIFICANT CHANGE UP (ref 3.5–5.3)
PROTHROM AB SERPL-ACNC: 11.3 SEC — SIGNIFICANT CHANGE UP (ref 9.8–12.7)
RBC # BLD: 4.39 M/UL — SIGNIFICANT CHANGE UP (ref 3.8–5.2)
RBC # FLD: 18.1 % — HIGH (ref 10.3–14.5)
SODIUM SERPL-SCNC: 142 MMOL/L — SIGNIFICANT CHANGE UP (ref 135–145)
TYPE + AB SCN PNL BLD: SIGNIFICANT CHANGE UP
WBC # BLD: 5.7 K/UL — SIGNIFICANT CHANGE UP (ref 3.8–10.5)
WBC # FLD AUTO: 5.7 K/UL — SIGNIFICANT CHANGE UP (ref 3.8–10.5)

## 2017-09-18 NOTE — H&P PST ADULT - PMH
Bladder prolapse, female, acquired  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  Colon cancer    Elevated blood pressure reading    Glaucoma    PUD (peptic ulcer disease)

## 2017-09-18 NOTE — H&P PST ADULT - PSH
Abnormal colonoscopy  2017  Breast lump  not sure which side  S/P cataract surgery  b/l  S/P endoscopy

## 2017-09-18 NOTE — H&P PST ADULT - GASTROINTESTINAL COMMENTS
Was overnight in hospital for rectal bleeding. Diagnosed with colon cancer via baseline colonoscopy.

## 2017-09-18 NOTE — H&P PST ADULT - HISTORY OF PRESENT ILLNESS
81 yo AA female presents to PST prior to proposed procedure. Reports coming to [facility] ED for rectal bleeding. Eventually colonoscopy in August? 2017 with Dr Shaw. States they found a cancerous polyp that needs to be removed.   Now for said procedure.

## 2017-09-18 NOTE — H&P PST ADULT - NEUROLOGICAL
Alert & oriented; no sensory, motor or coordination deficits, normal reflexes negative detailed exam

## 2017-09-18 NOTE — H&P PST ADULT - ASSESSMENT
83 yo female scheduled for laparoscopic anterior resection with Dr. Villalta on 9/25/17.     1. Labs as per surgeon  2. EKG on chart  3. Medical clearance with PCP Dr Kemal Vogel  4. discussed EZ sponges & day of procedure instructions  5. Due to elevated BP readings I notified Dr Vogel's office & pt is to see Dr Todd Vogel at 3:30 pm today. Pt given copy of vitals & EKG.

## 2017-09-21 RX ORDER — SODIUM CHLORIDE 9 MG/ML
3 INJECTION INTRAMUSCULAR; INTRAVENOUS; SUBCUTANEOUS EVERY 8 HOURS
Qty: 0 | Refills: 0 | Status: DISCONTINUED | OUTPATIENT
Start: 2017-09-22 | End: 2017-09-27

## 2017-09-21 RX ORDER — FAMOTIDINE 10 MG/ML
20 INJECTION INTRAVENOUS ONCE
Qty: 0 | Refills: 0 | Status: COMPLETED | OUTPATIENT
Start: 2017-09-22 | End: 2017-09-22

## 2017-09-21 RX ORDER — ACETAMINOPHEN 500 MG
975 TABLET ORAL ONCE
Qty: 0 | Refills: 0 | Status: COMPLETED | OUTPATIENT
Start: 2017-09-22 | End: 2017-09-22

## 2017-09-22 ENCOUNTER — INPATIENT (INPATIENT)
Facility: HOSPITAL | Age: 82
LOS: 4 days | Discharge: HOME | End: 2017-09-27
Attending: COLON & RECTAL SURGERY | Admitting: COLON & RECTAL SURGERY
Payer: MEDICARE

## 2017-09-22 ENCOUNTER — APPOINTMENT (OUTPATIENT)
Dept: UROLOGY | Facility: HOSPITAL | Age: 82
End: 2017-09-22

## 2017-09-22 ENCOUNTER — RESULT REVIEW (OUTPATIENT)
Age: 82
End: 2017-09-22

## 2017-09-22 VITALS
WEIGHT: 134.92 LBS | HEART RATE: 65 BPM | TEMPERATURE: 98 F | HEIGHT: 63 IN | RESPIRATION RATE: 17 BRPM | SYSTOLIC BLOOD PRESSURE: 186 MMHG | OXYGEN SATURATION: 100 % | DIASTOLIC BLOOD PRESSURE: 85 MMHG

## 2017-09-22 DIAGNOSIS — R93.3 ABNORMAL FINDINGS ON DIAGNOSTIC IMAGING OF OTHER PARTS OF DIGESTIVE TRACT: Chronic | ICD-10-CM

## 2017-09-22 DIAGNOSIS — Z98.890 OTHER SPECIFIED POSTPROCEDURAL STATES: Chronic | ICD-10-CM

## 2017-09-22 DIAGNOSIS — N63 UNSPECIFIED LUMP IN BREAST: Chronic | ICD-10-CM

## 2017-09-22 DIAGNOSIS — Z97.5 PRESENCE OF (INTRAUTERINE) CONTRACEPTIVE DEVICE: Chronic | ICD-10-CM

## 2017-09-22 DIAGNOSIS — Z98.49 CATARACT EXTRACTION STATUS, UNSPECIFIED EYE: Chronic | ICD-10-CM

## 2017-09-22 PROCEDURE — 52005 CYSTO W/URTRL CATHJ: CPT

## 2017-09-22 PROCEDURE — 88309 TISSUE EXAM BY PATHOLOGIST: CPT | Mod: 26

## 2017-09-22 RX ORDER — FOLIC ACID 0.8 MG
1 TABLET ORAL
Qty: 0 | Refills: 0 | COMMUNITY

## 2017-09-22 RX ORDER — ALVIMOPAN 12 MG/1
12 CAPSULE ORAL ONCE
Qty: 0 | Refills: 0 | Status: COMPLETED | OUTPATIENT
Start: 2017-09-22 | End: 2017-09-22

## 2017-09-22 RX ORDER — FENTANYL CITRATE 50 UG/ML
50 INJECTION INTRAVENOUS
Qty: 0 | Refills: 0 | Status: COMPLETED | OUTPATIENT
Start: 2017-09-22 | End: 2017-09-29

## 2017-09-22 RX ORDER — ACETAMINOPHEN 500 MG
1000 TABLET ORAL ONCE
Qty: 0 | Refills: 0 | Status: COMPLETED | OUTPATIENT
Start: 2017-09-22 | End: 2017-09-23

## 2017-09-22 RX ORDER — PROCHLORPERAZINE MALEATE 5 MG
10 TABLET ORAL ONCE
Qty: 0 | Refills: 0 | Status: COMPLETED | OUTPATIENT
Start: 2017-09-22 | End: 2017-09-22

## 2017-09-22 RX ORDER — SODIUM CHLORIDE 9 MG/ML
1000 INJECTION, SOLUTION INTRAVENOUS
Qty: 0 | Refills: 0 | Status: DISCONTINUED | OUTPATIENT
Start: 2017-09-22 | End: 2017-09-24

## 2017-09-22 RX ORDER — FENTANYL CITRATE 50 UG/ML
50 INJECTION INTRAVENOUS
Qty: 0 | Refills: 0 | Status: DISCONTINUED | OUTPATIENT
Start: 2017-09-22 | End: 2017-09-22

## 2017-09-22 RX ORDER — ONDANSETRON 8 MG/1
4 TABLET, FILM COATED ORAL ONCE
Qty: 0 | Refills: 0 | Status: COMPLETED | OUTPATIENT
Start: 2017-09-22 | End: 2017-09-22

## 2017-09-22 RX ORDER — MULTIVIT-MIN/FERROUS GLUCONATE 9 MG/15 ML
1 LIQUID (ML) ORAL
Qty: 0 | Refills: 0 | COMMUNITY

## 2017-09-22 RX ORDER — HEPARIN SODIUM 5000 [USP'U]/ML
5000 INJECTION INTRAVENOUS; SUBCUTANEOUS EVERY 12 HOURS
Qty: 0 | Refills: 0 | Status: DISCONTINUED | OUTPATIENT
Start: 2017-09-22 | End: 2017-09-27

## 2017-09-22 RX ORDER — METOPROLOL TARTRATE 50 MG
5 TABLET ORAL EVERY 6 HOURS
Qty: 0 | Refills: 0 | Status: DISCONTINUED | OUTPATIENT
Start: 2017-09-22 | End: 2017-09-26

## 2017-09-22 RX ORDER — OXYCODONE AND ACETAMINOPHEN 5; 325 MG/1; MG/1
1 TABLET ORAL EVERY 4 HOURS
Qty: 0 | Refills: 0 | Status: DISCONTINUED | OUTPATIENT
Start: 2017-09-22 | End: 2017-09-27

## 2017-09-22 RX ORDER — HYDROMORPHONE HYDROCHLORIDE 2 MG/ML
0.4 INJECTION INTRAMUSCULAR; INTRAVENOUS; SUBCUTANEOUS
Qty: 0 | Refills: 0 | Status: DISCONTINUED | OUTPATIENT
Start: 2017-09-22 | End: 2017-09-22

## 2017-09-22 RX ORDER — FENTANYL CITRATE 50 UG/ML
25 INJECTION INTRAVENOUS
Qty: 0 | Refills: 0 | Status: DISCONTINUED | OUTPATIENT
Start: 2017-09-22 | End: 2017-09-22

## 2017-09-22 RX ORDER — HYDROMORPHONE HYDROCHLORIDE 2 MG/ML
0.5 INJECTION INTRAMUSCULAR; INTRAVENOUS; SUBCUTANEOUS EVERY 4 HOURS
Qty: 0 | Refills: 0 | Status: DISCONTINUED | OUTPATIENT
Start: 2017-09-22 | End: 2017-09-27

## 2017-09-22 RX ORDER — OXYCODONE AND ACETAMINOPHEN 5; 325 MG/1; MG/1
2 TABLET ORAL EVERY 6 HOURS
Qty: 0 | Refills: 0 | Status: DISCONTINUED | OUTPATIENT
Start: 2017-09-22 | End: 2017-09-27

## 2017-09-22 RX ORDER — SODIUM CHLORIDE 9 MG/ML
1000 INJECTION, SOLUTION INTRAVENOUS
Qty: 0 | Refills: 0 | Status: DISCONTINUED | OUTPATIENT
Start: 2017-09-22 | End: 2017-09-22

## 2017-09-22 RX ORDER — FERROUS SULFATE 325(65) MG
1 TABLET ORAL
Qty: 0 | Refills: 0 | COMMUNITY

## 2017-09-22 RX ORDER — ONDANSETRON 8 MG/1
4 TABLET, FILM COATED ORAL EVERY 6 HOURS
Qty: 0 | Refills: 0 | Status: DISCONTINUED | OUTPATIENT
Start: 2017-09-22 | End: 2017-09-27

## 2017-09-22 RX ADMIN — ALVIMOPAN 12 MILLIGRAM(S): 12 CAPSULE ORAL at 07:53

## 2017-09-22 RX ADMIN — Medication 10 MILLIGRAM(S): at 14:32

## 2017-09-22 RX ADMIN — ONDANSETRON 4 MILLIGRAM(S): 8 TABLET, FILM COATED ORAL at 13:10

## 2017-09-22 RX ADMIN — HEPARIN SODIUM 5000 UNIT(S): 5000 INJECTION INTRAVENOUS; SUBCUTANEOUS at 21:07

## 2017-09-22 RX ADMIN — FAMOTIDINE 20 MILLIGRAM(S): 10 INJECTION INTRAVENOUS at 07:22

## 2017-09-22 RX ADMIN — FENTANYL CITRATE 50 MICROGRAM(S): 50 INJECTION INTRAVENOUS at 12:35

## 2017-09-22 RX ADMIN — HYDROMORPHONE HYDROCHLORIDE 0.5 MILLIGRAM(S): 2 INJECTION INTRAMUSCULAR; INTRAVENOUS; SUBCUTANEOUS at 22:50

## 2017-09-22 RX ADMIN — HYDROMORPHONE HYDROCHLORIDE 0.5 MILLIGRAM(S): 2 INJECTION INTRAMUSCULAR; INTRAVENOUS; SUBCUTANEOUS at 23:19

## 2017-09-22 RX ADMIN — Medication 5 MILLIGRAM(S): at 21:07

## 2017-09-22 RX ADMIN — Medication 975 MILLIGRAM(S): at 07:22

## 2017-09-22 RX ADMIN — SODIUM CHLORIDE 3 MILLILITER(S): 9 INJECTION INTRAMUSCULAR; INTRAVENOUS; SUBCUTANEOUS at 21:04

## 2017-09-22 NOTE — PATIENT PROFILE ADULT. - PMH
Anemia, unspecified type    Bladder prolapse, female, acquired  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  Colon cancer    Elevated blood pressure reading    Glaucoma    PUD (peptic ulcer disease)

## 2017-09-22 NOTE — PATIENT PROFILE ADULT. - PSH
Abnormal colonoscopy  2017  Breast lump  not sure which side  Intrauterine pessary    S/P cataract surgery  b/l  S/P endoscopy

## 2017-09-22 NOTE — BRIEF OPERATIVE NOTE - PROCEDURE
<<-----Click on this checkbox to enter Procedure Cystoscopy with bilateral ureteroscopy with insertion of stent  09/22/2017    Active  QUENTIN

## 2017-09-22 NOTE — BRIEF OPERATIVE NOTE - PROCEDURE
<<-----Click on this checkbox to enter Procedure Laparoscopic colectomy, partial, with anastomosis  09/22/2017  left  Active  SARAH  Cystoscopy with bilateral ureteroscopy with insertion of stent  09/22/2017    Active  Kai Mi

## 2017-09-22 NOTE — PATIENT PROFILE ADULT. - NS PRO INDICAT FLU
Patient is 18 to 64 years of age with chronic diseases or conditions/Patient/surrogate requesting vaccine

## 2017-09-23 LAB
ANION GAP SERPL CALC-SCNC: 11 MMOL/L — SIGNIFICANT CHANGE UP (ref 5–17)
BASOPHILS # BLD AUTO: 0.1 K/UL — SIGNIFICANT CHANGE UP (ref 0–0.2)
BASOPHILS NFR BLD AUTO: 0.7 % — SIGNIFICANT CHANGE UP (ref 0–2)
BUN SERPL-MCNC: 6 MG/DL — LOW (ref 7–23)
CALCIUM SERPL-MCNC: 8.3 MG/DL — LOW (ref 8.5–10.1)
CHLORIDE SERPL-SCNC: 103 MMOL/L — SIGNIFICANT CHANGE UP (ref 96–108)
CO2 SERPL-SCNC: 23 MMOL/L — SIGNIFICANT CHANGE UP (ref 22–31)
CREAT SERPL-MCNC: 0.63 MG/DL — SIGNIFICANT CHANGE UP (ref 0.5–1.3)
EOSINOPHIL # BLD AUTO: 0 K/UL — SIGNIFICANT CHANGE UP (ref 0–0.5)
EOSINOPHIL NFR BLD AUTO: 0.1 % — SIGNIFICANT CHANGE UP (ref 0–6)
GLUCOSE SERPL-MCNC: 111 MG/DL — HIGH (ref 70–99)
HCT VFR BLD CALC: 34.4 % — LOW (ref 34.5–45)
HGB BLD-MCNC: 11.8 G/DL — SIGNIFICANT CHANGE UP (ref 11.5–15.5)
LYMPHOCYTES # BLD AUTO: 1.3 K/UL — SIGNIFICANT CHANGE UP (ref 1–3.3)
LYMPHOCYTES # BLD AUTO: 12.9 % — LOW (ref 13–44)
MCHC RBC-ENTMCNC: 26.7 PG — LOW (ref 27–34)
MCHC RBC-ENTMCNC: 34.2 GM/DL — SIGNIFICANT CHANGE UP (ref 32–36)
MCV RBC AUTO: 78 FL — LOW (ref 80–100)
MONOCYTES # BLD AUTO: 0.6 K/UL — SIGNIFICANT CHANGE UP (ref 0–0.9)
MONOCYTES NFR BLD AUTO: 6.3 % — SIGNIFICANT CHANGE UP (ref 2–14)
NEUTROPHILS # BLD AUTO: 8.1 K/UL — HIGH (ref 1.8–7.4)
NEUTROPHILS NFR BLD AUTO: 80 % — HIGH (ref 43–77)
PLATELET # BLD AUTO: 160 K/UL — SIGNIFICANT CHANGE UP (ref 150–400)
POTASSIUM SERPL-MCNC: 3.1 MMOL/L — LOW (ref 3.5–5.3)
POTASSIUM SERPL-SCNC: 3.1 MMOL/L — LOW (ref 3.5–5.3)
RBC # BLD: 4.41 M/UL — SIGNIFICANT CHANGE UP (ref 3.8–5.2)
RBC # FLD: 18.2 % — HIGH (ref 10.3–14.5)
SODIUM SERPL-SCNC: 137 MMOL/L — SIGNIFICANT CHANGE UP (ref 135–145)
WBC # BLD: 10.2 K/UL — SIGNIFICANT CHANGE UP (ref 3.8–10.5)
WBC # FLD AUTO: 10.2 K/UL — SIGNIFICANT CHANGE UP (ref 3.8–10.5)

## 2017-09-23 RX ORDER — POTASSIUM CHLORIDE 20 MEQ
10 PACKET (EA) ORAL
Qty: 0 | Refills: 0 | Status: COMPLETED | OUTPATIENT
Start: 2017-09-23 | End: 2017-09-23

## 2017-09-23 RX ORDER — LATANOPROST 0.05 MG/ML
1 SOLUTION/ DROPS OPHTHALMIC; TOPICAL DAILY
Qty: 0 | Refills: 0 | Status: DISCONTINUED | OUTPATIENT
Start: 2017-09-23 | End: 2017-09-27

## 2017-09-23 RX ORDER — PILOCARPINE HCL 4 %
1 DROPS OPHTHALMIC (EYE) THREE TIMES A DAY
Qty: 0 | Refills: 0 | Status: DISCONTINUED | OUTPATIENT
Start: 2017-09-23 | End: 2017-09-27

## 2017-09-23 RX ORDER — POTASSIUM CHLORIDE 20 MEQ
10 PACKET (EA) ORAL
Qty: 0 | Refills: 0 | Status: DISCONTINUED | OUTPATIENT
Start: 2017-09-23 | End: 2017-09-23

## 2017-09-23 RX ORDER — AMLODIPINE BESYLATE 2.5 MG/1
5 TABLET ORAL DAILY
Qty: 0 | Refills: 0 | Status: DISCONTINUED | OUTPATIENT
Start: 2017-09-23 | End: 2017-09-25

## 2017-09-23 RX ORDER — DORZOLAMIDE HYDROCHLORIDE TIMOLOL MALEATE 20; 5 MG/ML; MG/ML
1 SOLUTION/ DROPS OPHTHALMIC
Qty: 0 | Refills: 0 | Status: DISCONTINUED | OUTPATIENT
Start: 2017-09-23 | End: 2017-09-27

## 2017-09-23 RX ADMIN — Medication 100 MILLIEQUIVALENT(S): at 14:35

## 2017-09-23 RX ADMIN — Medication 100 MILLIEQUIVALENT(S): at 12:21

## 2017-09-23 RX ADMIN — HYDROMORPHONE HYDROCHLORIDE 0.5 MILLIGRAM(S): 2 INJECTION INTRAMUSCULAR; INTRAVENOUS; SUBCUTANEOUS at 16:02

## 2017-09-23 RX ADMIN — HEPARIN SODIUM 5000 UNIT(S): 5000 INJECTION INTRAVENOUS; SUBCUTANEOUS at 17:37

## 2017-09-23 RX ADMIN — SODIUM CHLORIDE 3 MILLILITER(S): 9 INJECTION INTRAMUSCULAR; INTRAVENOUS; SUBCUTANEOUS at 05:32

## 2017-09-23 RX ADMIN — Medication 5 MILLIGRAM(S): at 12:21

## 2017-09-23 RX ADMIN — Medication 1 DROP(S): at 13:34

## 2017-09-23 RX ADMIN — SODIUM CHLORIDE 75 MILLILITER(S): 9 INJECTION, SOLUTION INTRAVENOUS at 04:20

## 2017-09-23 RX ADMIN — Medication 100 MILLIEQUIVALENT(S): at 16:10

## 2017-09-23 RX ADMIN — HEPARIN SODIUM 5000 UNIT(S): 5000 INJECTION INTRAVENOUS; SUBCUTANEOUS at 05:33

## 2017-09-23 RX ADMIN — SODIUM CHLORIDE 75 MILLILITER(S): 9 INJECTION, SOLUTION INTRAVENOUS at 06:36

## 2017-09-23 RX ADMIN — Medication 100 MILLIEQUIVALENT(S): at 17:39

## 2017-09-23 RX ADMIN — OXYCODONE AND ACETAMINOPHEN 2 TABLET(S): 5; 325 TABLET ORAL at 19:40

## 2017-09-23 RX ADMIN — DORZOLAMIDE HYDROCHLORIDE TIMOLOL MALEATE 1 DROP(S): 20; 5 SOLUTION/ DROPS OPHTHALMIC at 17:38

## 2017-09-23 RX ADMIN — Medication 5 MILLIGRAM(S): at 18:13

## 2017-09-23 RX ADMIN — AMLODIPINE BESYLATE 5 MILLIGRAM(S): 2.5 TABLET ORAL at 14:36

## 2017-09-23 RX ADMIN — Medication 1.25 MILLIGRAM(S): at 15:40

## 2017-09-23 RX ADMIN — Medication 1 DROP(S): at 21:46

## 2017-09-23 RX ADMIN — Medication 1000 MILLIGRAM(S): at 05:34

## 2017-09-23 RX ADMIN — ONDANSETRON 4 MILLIGRAM(S): 8 TABLET, FILM COATED ORAL at 11:15

## 2017-09-23 RX ADMIN — SODIUM CHLORIDE 3 MILLILITER(S): 9 INJECTION INTRAMUSCULAR; INTRAVENOUS; SUBCUTANEOUS at 21:42

## 2017-09-23 RX ADMIN — Medication 100 MILLIEQUIVALENT(S): at 19:35

## 2017-09-23 RX ADMIN — OXYCODONE AND ACETAMINOPHEN 2 TABLET(S): 5; 325 TABLET ORAL at 20:10

## 2017-09-23 RX ADMIN — Medication 400 MILLIGRAM(S): at 05:32

## 2017-09-23 RX ADMIN — Medication 5 MILLIGRAM(S): at 05:33

## 2017-09-23 RX ADMIN — HYDROMORPHONE HYDROCHLORIDE 0.5 MILLIGRAM(S): 2 INJECTION INTRAMUSCULAR; INTRAVENOUS; SUBCUTANEOUS at 16:15

## 2017-09-23 NOTE — CONSULT NOTE ADULT - PROBLEM SELECTOR RECOMMENDATION 9
POD#2  PAIN CONTROL  MOBILIZE - OOB   AWAIT BOWEL FUNCTION TO RETURN  ENCOURAGED INCENTIVE SPIROMETRY  DVT PROPHY - SQ HEPARIN, VENODYNES

## 2017-09-23 NOTE — CONSULT NOTE ADULT - PROBLEM SELECTOR RECOMMENDATION 4
PT NONCOMPLIANT WITH BP MEDS PRIOR TO SURGERY  CONT METOPROLOL AND ADDED VASOTEC IV FOR NOW  RESTART NORVASC ONCE RAMSES PO WELL  BP BETTER

## 2017-09-23 NOTE — PROGRESS NOTE ADULT - SUBJECTIVE AND OBJECTIVE BOX
Pt has some Nausea, but minimal c/o pain.    Afeb  Unlabored breathing  Abd soft, NT, ND  LEs nontender  ureteral stent removed.    Sips of clears as tolerated.    Lopressor for BP.  Hospitalist consult.  Replete K  Eye Drops ordered.    OOB.

## 2017-09-23 NOTE — CONSULT NOTE ADULT - SUBJECTIVE AND OBJECTIVE BOX
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  Glaucoma  PUD (peptic ulcer disease)  Intrauterine pessary  S/P endoscopy  Abnormal colonoscopy: 2017  S/P cataract surgery: b/l  Breast lump: not sure which side      FAMILY HISTORY:   non-contributory to the patient's current presentation  FAMILY HISTORY:  No pertinent family history in first degree relatives      SOCIAL HISTORY:  no smoking, no alcohol, no drugs    REVIEW OF SYSTEMS:   All 10 systems reviewed in detailed and found to be negative with the exception of what has already been described above    MEDICATIONS  (STANDING):  sodium chloride 0.9% lock flush 3 milliLiter(s) IV Push every 8 hours  heparin  Injectable 5000 Unit(s) SubCutaneous every 12 hours  lactated ringers. 1000 milliLiter(s) (75 mL/Hr) IV Continuous <Continuous>  metoprolol Injectable 5 milliGRAM(s) IV Push every 6 hours  pilocarpine 4% Solution 1 Drop(s) Both EYES three times a day  dorzolamide 2%/timolol 0.5% Ophthalmic Solution 1 Drop(s) Both EYES two times a day  latanoprost 0.005% Ophthalmic Solution 1 Drop(s) Both EYES daily  potassium chloride  10 mEq/100 mL IVPB 10 milliEquivalent(s) IV Intermittent every 1 hour    MEDICATIONS  (PRN):  oxyCODONE    5 mG/acetaminophen 325 mG 1 Tablet(s) Oral every 4 hours PRN Mild Pain (1 - 3)  oxyCODONE    5 mG/acetaminophen 325 mG 2 Tablet(s) Oral every 6 hours PRN Moderate Pain (4 - 6)  HYDROmorphone  Injectable 0.5 milliGRAM(s) IV Push every 4 hours PRN Severe Pain  ondansetron Injectable 4 milliGRAM(s) IV Push every 6 hours PRN Nausea      Allergies    No Known Allergies    Intolerances          PHYSICAL EXAM:    Vital Signs Last 24 Hrs  T(C): 37.9 (23 Sep 2017 08:43), Max: 37.9 (23 Sep 2017 08:43)  T(F): 100.2 (23 Sep 2017 08:43), Max: 100.2 (23 Sep 2017 08:43)  HR: 74 (23 Sep 2017 12:00) (62 - 85)  BP: 179/80 (23 Sep 2017 12:00) (114/50 - 181/75)  BP(mean): 101 (23 Sep 2017 12:00) (66 - 103)  RR: 20 (23 Sep 2017 12:00) (9 - 28)  SpO2: 98% (23 Sep 2017 12:00) (98% - 100%)    GEN: A and O, NAD, Well dressed, mood stable  HEENT:   pupils equal and reactive, EOMI, no oropharyngeal lesions, erythema, exudates, oral thrush    NECK:   supple, no carotid bruits, no palpable lymph nodes, no thyromegaly    CV:  +S1, +S2, regular, no murmurs or rubs    RESP:   lungs clear to auscultation bilaterally, no wheezing, rales, rhonchi, good air entry bilaterally    GI:  abdomen soft, non-tender, non-distended, normal BS, no bruits, no abdominal masses, no palpable masses    RECTAL:  not examined    :  not examined    MSK:   normal muscle tone, no atrophy, no rigidity, no contractions    EXT:   no clubbing, no cyanosis, no edema, no calf pain, swelling or erythema    VASCULAR:  pulses equal and symmetric in the upper and lower extremities    NEURO:  AAOX3, no focal neurological deficits, follows all commands, able to move extremities spontaneously    SKIN:  no ulcers, lesions or rashes    LABS/IMAGIN.8   10.2  )-----------( 160      ( 23 Sep 2017 05:20 )             34.4     09-    137  |  103  |  6<L>  ----------------------------<  111<H>  3.1<L>   |  23  |  0.63    Ca    8.3<L>      23 Sep 2017 05:20                                              EKG:     RADIOLOGY STUDIES:      DVT PROPHYLAXIS: 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  Glaucoma  PUD (peptic ulcer disease)  Intrauterine pessary  S/P endoscopy  Abnormal colonoscopy: 2017  S/P cataract surgery: b/l  Breast lump: not sure which side      FAMILY HISTORY:   non-contributory to the patient's current presentation  FAMILY HISTORY:  No pertinent family history in first degree relatives      SOCIAL HISTORY:  no smoking, no alcohol, no drugs    REVIEW OF SYSTEMS:   All 10 systems reviewed in detailed and found to be negative with the exception of what has already been described above    MEDICATIONS  (STANDING):  sodium chloride 0.9% lock flush 3 milliLiter(s) IV Push every 8 hours  heparin  Injectable 5000 Unit(s) SubCutaneous every 12 hours  lactated ringers. 1000 milliLiter(s) (75 mL/Hr) IV Continuous <Continuous>  metoprolol Injectable 5 milliGRAM(s) IV Push every 6 hours  pilocarpine 4% Solution 1 Drop(s) Both EYES three times a day  dorzolamide 2%/timolol 0.5% Ophthalmic Solution 1 Drop(s) Both EYES two times a day  latanoprost 0.005% Ophthalmic Solution 1 Drop(s) Both EYES daily  potassium chloride  10 mEq/100 mL IVPB 10 milliEquivalent(s) IV Intermittent every 1 hour    MEDICATIONS  (PRN):  oxyCODONE    5 mG/acetaminophen 325 mG 1 Tablet(s) Oral every 4 hours PRN Mild Pain (1 - 3)  oxyCODONE    5 mG/acetaminophen 325 mG 2 Tablet(s) Oral every 6 hours PRN Moderate Pain (4 - 6)  HYDROmorphone  Injectable 0.5 milliGRAM(s) IV Push every 4 hours PRN Severe Pain  ondansetron Injectable 4 milliGRAM(s) IV Push every 6 hours PRN Nausea      Allergies    No Known Allergies    Intolerances          PHYSICAL EXAM:    Vital Signs Last 24 Hrs  T(C): 37.9 (23 Sep 2017 08:43), Max: 37.9 (23 Sep 2017 08:43)  T(F): 100.2 (23 Sep 2017 08:43), Max: 100.2 (23 Sep 2017 08:43)  HR: 74 (23 Sep 2017 12:00) (62 - 85)  BP: 179/80 (23 Sep 2017 12:00) (114/50 - 181/75)  BP(mean): 101 (23 Sep 2017 12:00) (66 - 103)  RR: 20 (23 Sep 2017 12:00) (9 - 28)  SpO2: 98% (23 Sep 2017 12:00) (98% - 100%)    GEN: A and O, NAD, mood stable  HEENT:   pupils equal and reactive, EOMI, NC/AT    NECK:   supple    CV:  +S1, +S2, regular, POS ELGIN    RESP:   lungs clear to auscultation bilaterally, SCATTERED CRACKLES AT THE BASES    GI:  abdomen SOFTLY DISTENDED, DRESSING C/D/I, HYPOACTIVE BS    RECTAL:  not examined    :  POS FLORES    MSK:   normal muscle tone, no atrophy, no rigidity, no contractions    EXT:   no clubbing, no cyanosis, no edema, no calf pain, swelling or erythema    VASCULAR:  pulses equal and symmetric in the upper and lower extremities    NEURO:  AAOX3, no focal neurological deficits, follows all commands, able to move extremities spontaneously    SKIN:  no ulcers, lesions or rashes    LABS/IMAGIN.8   10.2  )-----------( 160      ( 23 Sep 2017 05:20 )             34.4     09-23    137  |  103  |  6<L>  ----------------------------<  111<H>  3.1<L>   |  23  |  0.63    Ca    8.3<L>      23 Sep 2017 05:20      AWAITING AM LABS      EKG: SR@63BPM, 1ST DEG AVB HPI: 83 y/o female with HTN, anemia, colon cancer s/p sigmoid resection. Medicine consult requested fro hypokalemia and HTN.      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  Glaucoma  PUD (peptic ulcer disease)  Intrauterine pessary  S/P endoscopy  Abnormal colonoscopy: 2017  S/P cataract surgery: b/l  Breast lump: not sure which side      FAMILY HISTORY:   non-contributory to the patient's current presentation  FAMILY HISTORY:  No pertinent family history in first degree relatives      SOCIAL HISTORY:  no smoking, no alcohol, no drugs    REVIEW OF SYSTEMS:   All 10 systems reviewed in detailed and found to be negative with the exception of what has already been described above    MEDICATIONS  (STANDING):  sodium chloride 0.9% lock flush 3 milliLiter(s) IV Push every 8 hours  heparin  Injectable 5000 Unit(s) SubCutaneous every 12 hours  lactated ringers. 1000 milliLiter(s) (75 mL/Hr) IV Continuous <Continuous>  metoprolol Injectable 5 milliGRAM(s) IV Push every 6 hours  pilocarpine 4% Solution 1 Drop(s) Both EYES three times a day  dorzolamide 2%/timolol 0.5% Ophthalmic Solution 1 Drop(s) Both EYES two times a day  latanoprost 0.005% Ophthalmic Solution 1 Drop(s) Both EYES daily  potassium chloride  10 mEq/100 mL IVPB 10 milliEquivalent(s) IV Intermittent every 1 hour    MEDICATIONS  (PRN):  oxyCODONE    5 mG/acetaminophen 325 mG 1 Tablet(s) Oral every 4 hours PRN Mild Pain (1 - 3)  oxyCODONE    5 mG/acetaminophen 325 mG 2 Tablet(s) Oral every 6 hours PRN Moderate Pain (4 - 6)  HYDROmorphone  Injectable 0.5 milliGRAM(s) IV Push every 4 hours PRN Severe Pain  ondansetron Injectable 4 milliGRAM(s) IV Push every 6 hours PRN Nausea      Allergies    No Known Allergies    Intolerances          PHYSICAL EXAM:    Vital Signs Last 24 Hrs  T(C): 37.9 (23 Sep 2017 08:43), Max: 37.9 (23 Sep 2017 08:43)  T(F): 100.2 (23 Sep 2017 08:43), Max: 100.2 (23 Sep 2017 08:43)  HR: 74 (23 Sep 2017 12:00) (62 - 85)  BP: 179/80 (23 Sep 2017 12:00) (114/50 - 181/75)  BP(mean): 101 (23 Sep 2017 12:00) (66 - 103)  RR: 20 (23 Sep 2017 12:00) (9 - 28)  SpO2: 98% (23 Sep 2017 12:00) (98% - 100%)    GEN: A and O, NAD, mood stable  HEENT:   pupils equal and reactive, EOMI, NC/AT    NECK:   supple    CV:  +S1, +S2, regular, POS ELGIN    RESP:   lungs clear to auscultation bilaterally, SCATTERED CRACKLES AT THE BASES    GI:  abdomen SOFTLY DISTENDED, DRESSING C/D/I, HYPOACTIVE BS    RECTAL:  not examined    :  POS FLORES    MSK:   normal muscle tone, no atrophy, no rigidity, no contractions    EXT:   no clubbing, no cyanosis, no edema, no calf pain, swelling or erythema    VASCULAR:  pulses equal and symmetric in the upper and lower extremities    NEURO:  AAOX3, no focal neurological deficits, follows all commands, able to move extremities spontaneously    SKIN:  no ulcers, lesions or rashes    LABS/IMAGIN.8   10.2  )-----------( 160      ( 23 Sep 2017 05:20 )             34.4     09-23    137  |  103  |  6<L>  ----------------------------<  111<H>  3.1<L>   |  23  |  0.63    Ca    8.3<L>      23 Sep 2017 05:20      AWAITING AM LABS      EKG: SR@63BPM, 1ST DEG AVB

## 2017-09-24 DIAGNOSIS — I10 ESSENTIAL (PRIMARY) HYPERTENSION: ICD-10-CM

## 2017-09-24 DIAGNOSIS — E87.6 HYPOKALEMIA: ICD-10-CM

## 2017-09-24 DIAGNOSIS — C18.9 MALIGNANT NEOPLASM OF COLON, UNSPECIFIED: ICD-10-CM

## 2017-09-24 DIAGNOSIS — Z98.890 OTHER SPECIFIED POSTPROCEDURAL STATES: ICD-10-CM

## 2017-09-24 DIAGNOSIS — D64.9 ANEMIA, UNSPECIFIED: ICD-10-CM

## 2017-09-24 LAB
ANION GAP SERPL CALC-SCNC: 0 MMOL/L — LOW (ref 5–17)
BUN SERPL-MCNC: 6 MG/DL — LOW (ref 7–23)
CALCIUM SERPL-MCNC: 8.6 MG/DL — SIGNIFICANT CHANGE UP (ref 8.5–10.1)
CHLORIDE SERPL-SCNC: 108 MMOL/L — SIGNIFICANT CHANGE UP (ref 96–108)
CO2 SERPL-SCNC: 28 MMOL/L — SIGNIFICANT CHANGE UP (ref 22–31)
CREAT SERPL-MCNC: 0.5 MG/DL — SIGNIFICANT CHANGE UP (ref 0.5–1.3)
GLUCOSE SERPL-MCNC: 114 MG/DL — HIGH (ref 70–99)
HCT VFR BLD CALC: 35.2 % — SIGNIFICANT CHANGE UP (ref 34.5–45)
HGB BLD-MCNC: 11.9 G/DL — SIGNIFICANT CHANGE UP (ref 11.5–15.5)
MCHC RBC-ENTMCNC: 26.5 PG — LOW (ref 27–34)
MCHC RBC-ENTMCNC: 33.9 GM/DL — SIGNIFICANT CHANGE UP (ref 32–36)
MCV RBC AUTO: 78.2 FL — LOW (ref 80–100)
PLATELET # BLD AUTO: 164 K/UL — SIGNIFICANT CHANGE UP (ref 150–400)
POTASSIUM SERPL-MCNC: 3.6 MMOL/L — SIGNIFICANT CHANGE UP (ref 3.5–5.3)
POTASSIUM SERPL-SCNC: 3.6 MMOL/L — SIGNIFICANT CHANGE UP (ref 3.5–5.3)
RBC # BLD: 4.5 M/UL — SIGNIFICANT CHANGE UP (ref 3.8–5.2)
RBC # FLD: 18.2 % — HIGH (ref 10.3–14.5)
SODIUM SERPL-SCNC: 136 MMOL/L — SIGNIFICANT CHANGE UP (ref 135–145)
WBC # BLD: 7.9 K/UL — SIGNIFICANT CHANGE UP (ref 3.8–10.5)
WBC # FLD AUTO: 7.9 K/UL — SIGNIFICANT CHANGE UP (ref 3.8–10.5)

## 2017-09-24 RX ORDER — DEXTROSE MONOHYDRATE, SODIUM CHLORIDE, AND POTASSIUM CHLORIDE 50; .745; 4.5 G/1000ML; G/1000ML; G/1000ML
1000 INJECTION, SOLUTION INTRAVENOUS
Qty: 0 | Refills: 0 | Status: COMPLETED | OUTPATIENT
Start: 2017-09-24 | End: 2017-09-25

## 2017-09-24 RX ORDER — PANTOPRAZOLE SODIUM 20 MG/1
40 TABLET, DELAYED RELEASE ORAL
Qty: 0 | Refills: 0 | Status: DISCONTINUED | OUTPATIENT
Start: 2017-09-24 | End: 2017-09-27

## 2017-09-24 RX ADMIN — SODIUM CHLORIDE 3 MILLILITER(S): 9 INJECTION INTRAMUSCULAR; INTRAVENOUS; SUBCUTANEOUS at 21:52

## 2017-09-24 RX ADMIN — HYDROMORPHONE HYDROCHLORIDE 0.5 MILLIGRAM(S): 2 INJECTION INTRAMUSCULAR; INTRAVENOUS; SUBCUTANEOUS at 17:16

## 2017-09-24 RX ADMIN — AMLODIPINE BESYLATE 5 MILLIGRAM(S): 2.5 TABLET ORAL at 06:11

## 2017-09-24 RX ADMIN — Medication 5 MILLIGRAM(S): at 17:17

## 2017-09-24 RX ADMIN — Medication 1 DROP(S): at 13:42

## 2017-09-24 RX ADMIN — HEPARIN SODIUM 5000 UNIT(S): 5000 INJECTION INTRAVENOUS; SUBCUTANEOUS at 06:12

## 2017-09-24 RX ADMIN — Medication 5 MILLIGRAM(S): at 06:12

## 2017-09-24 RX ADMIN — DORZOLAMIDE HYDROCHLORIDE TIMOLOL MALEATE 1 DROP(S): 20; 5 SOLUTION/ DROPS OPHTHALMIC at 06:12

## 2017-09-24 RX ADMIN — SODIUM CHLORIDE 3 MILLILITER(S): 9 INJECTION INTRAMUSCULAR; INTRAVENOUS; SUBCUTANEOUS at 06:11

## 2017-09-24 RX ADMIN — PANTOPRAZOLE SODIUM 40 MILLIGRAM(S): 20 TABLET, DELAYED RELEASE ORAL at 06:11

## 2017-09-24 RX ADMIN — Medication 5 MILLIGRAM(S): at 00:02

## 2017-09-24 RX ADMIN — Medication 1 DROP(S): at 06:12

## 2017-09-24 RX ADMIN — HYDROMORPHONE HYDROCHLORIDE 0.5 MILLIGRAM(S): 2 INJECTION INTRAMUSCULAR; INTRAVENOUS; SUBCUTANEOUS at 17:30

## 2017-09-24 RX ADMIN — HYDROMORPHONE HYDROCHLORIDE 0.5 MILLIGRAM(S): 2 INJECTION INTRAMUSCULAR; INTRAVENOUS; SUBCUTANEOUS at 11:08

## 2017-09-24 RX ADMIN — HYDROMORPHONE HYDROCHLORIDE 0.5 MILLIGRAM(S): 2 INJECTION INTRAMUSCULAR; INTRAVENOUS; SUBCUTANEOUS at 11:30

## 2017-09-24 RX ADMIN — DEXTROSE MONOHYDRATE, SODIUM CHLORIDE, AND POTASSIUM CHLORIDE 50 MILLILITER(S): 50; .745; 4.5 INJECTION, SOLUTION INTRAVENOUS at 07:51

## 2017-09-24 RX ADMIN — LATANOPROST 1 DROP(S): 0.05 SOLUTION/ DROPS OPHTHALMIC; TOPICAL at 00:02

## 2017-09-24 RX ADMIN — DORZOLAMIDE HYDROCHLORIDE TIMOLOL MALEATE 1 DROP(S): 20; 5 SOLUTION/ DROPS OPHTHALMIC at 17:18

## 2017-09-24 RX ADMIN — LATANOPROST 1 DROP(S): 0.05 SOLUTION/ DROPS OPHTHALMIC; TOPICAL at 21:54

## 2017-09-24 RX ADMIN — HEPARIN SODIUM 5000 UNIT(S): 5000 INJECTION INTRAVENOUS; SUBCUTANEOUS at 17:17

## 2017-09-24 RX ADMIN — Medication 1 DROP(S): at 21:53

## 2017-09-24 NOTE — PROGRESS NOTE ADULT - SUBJECTIVE AND OBJECTIVE BOX
Pt well.  No flatus.  Some heartburn, belching  Pain well controlled    Afeb  Cor Reg  Resp Unlabored breathing  Abd softly distended.    LEs nontender    D/C Luther. Reduce IVF  Check potassium  ambulate  Await return of bowel function

## 2017-09-25 LAB
ANION GAP SERPL CALC-SCNC: 8 MMOL/L — SIGNIFICANT CHANGE UP (ref 5–17)
BUN SERPL-MCNC: 5 MG/DL — LOW (ref 7–23)
CALCIUM SERPL-MCNC: 8.5 MG/DL — SIGNIFICANT CHANGE UP (ref 8.5–10.1)
CHLORIDE SERPL-SCNC: 108 MMOL/L — SIGNIFICANT CHANGE UP (ref 96–108)
CO2 SERPL-SCNC: 27 MMOL/L — SIGNIFICANT CHANGE UP (ref 22–31)
CREAT SERPL-MCNC: 0.49 MG/DL — LOW (ref 0.5–1.3)
GLUCOSE SERPL-MCNC: 112 MG/DL — HIGH (ref 70–99)
HCT VFR BLD CALC: 32.6 % — LOW (ref 34.5–45)
HGB BLD-MCNC: 10.7 G/DL — LOW (ref 11.5–15.5)
MCHC RBC-ENTMCNC: 26 PG — LOW (ref 27–34)
MCHC RBC-ENTMCNC: 32.7 GM/DL — SIGNIFICANT CHANGE UP (ref 32–36)
MCV RBC AUTO: 79.5 FL — LOW (ref 80–100)
PLATELET # BLD AUTO: 142 K/UL — LOW (ref 150–400)
POTASSIUM SERPL-MCNC: 3.7 MMOL/L — SIGNIFICANT CHANGE UP (ref 3.5–5.3)
POTASSIUM SERPL-SCNC: 3.7 MMOL/L — SIGNIFICANT CHANGE UP (ref 3.5–5.3)
RBC # BLD: 4.1 M/UL — SIGNIFICANT CHANGE UP (ref 3.8–5.2)
RBC # FLD: 18.3 % — HIGH (ref 10.3–14.5)
SODIUM SERPL-SCNC: 143 MMOL/L — SIGNIFICANT CHANGE UP (ref 135–145)
WBC # BLD: 6.3 K/UL — SIGNIFICANT CHANGE UP (ref 3.8–10.5)
WBC # FLD AUTO: 6.3 K/UL — SIGNIFICANT CHANGE UP (ref 3.8–10.5)

## 2017-09-25 RX ORDER — AMLODIPINE BESYLATE 2.5 MG/1
10 TABLET ORAL DAILY
Qty: 0 | Refills: 0 | Status: DISCONTINUED | OUTPATIENT
Start: 2017-09-25 | End: 2017-09-27

## 2017-09-25 RX ADMIN — OXYCODONE AND ACETAMINOPHEN 1 TABLET(S): 5; 325 TABLET ORAL at 09:14

## 2017-09-25 RX ADMIN — HEPARIN SODIUM 5000 UNIT(S): 5000 INJECTION INTRAVENOUS; SUBCUTANEOUS at 18:11

## 2017-09-25 RX ADMIN — PANTOPRAZOLE SODIUM 40 MILLIGRAM(S): 20 TABLET, DELAYED RELEASE ORAL at 06:34

## 2017-09-25 RX ADMIN — HYDROMORPHONE HYDROCHLORIDE 0.5 MILLIGRAM(S): 2 INJECTION INTRAMUSCULAR; INTRAVENOUS; SUBCUTANEOUS at 02:15

## 2017-09-25 RX ADMIN — SODIUM CHLORIDE 3 MILLILITER(S): 9 INJECTION INTRAMUSCULAR; INTRAVENOUS; SUBCUTANEOUS at 06:32

## 2017-09-25 RX ADMIN — Medication 1.25 MILLIGRAM(S): at 08:01

## 2017-09-25 RX ADMIN — AMLODIPINE BESYLATE 5 MILLIGRAM(S): 2.5 TABLET ORAL at 06:34

## 2017-09-25 RX ADMIN — Medication 1 DROP(S): at 06:34

## 2017-09-25 RX ADMIN — Medication 5 MILLIGRAM(S): at 06:33

## 2017-09-25 RX ADMIN — HYDROMORPHONE HYDROCHLORIDE 0.5 MILLIGRAM(S): 2 INJECTION INTRAMUSCULAR; INTRAVENOUS; SUBCUTANEOUS at 01:54

## 2017-09-25 RX ADMIN — SODIUM CHLORIDE 3 MILLILITER(S): 9 INJECTION INTRAMUSCULAR; INTRAVENOUS; SUBCUTANEOUS at 15:15

## 2017-09-25 RX ADMIN — AMLODIPINE BESYLATE 10 MILLIGRAM(S): 2.5 TABLET ORAL at 21:39

## 2017-09-25 RX ADMIN — LATANOPROST 1 DROP(S): 0.05 SOLUTION/ DROPS OPHTHALMIC; TOPICAL at 11:34

## 2017-09-25 RX ADMIN — SODIUM CHLORIDE 3 MILLILITER(S): 9 INJECTION INTRAMUSCULAR; INTRAVENOUS; SUBCUTANEOUS at 21:51

## 2017-09-25 RX ADMIN — OXYCODONE AND ACETAMINOPHEN 1 TABLET(S): 5; 325 TABLET ORAL at 10:00

## 2017-09-25 RX ADMIN — DEXTROSE MONOHYDRATE, SODIUM CHLORIDE, AND POTASSIUM CHLORIDE 50 MILLILITER(S): 50; .745; 4.5 INJECTION, SOLUTION INTRAVENOUS at 06:33

## 2017-09-25 RX ADMIN — DORZOLAMIDE HYDROCHLORIDE TIMOLOL MALEATE 1 DROP(S): 20; 5 SOLUTION/ DROPS OPHTHALMIC at 18:13

## 2017-09-25 RX ADMIN — HEPARIN SODIUM 5000 UNIT(S): 5000 INJECTION INTRAVENOUS; SUBCUTANEOUS at 06:34

## 2017-09-25 RX ADMIN — Medication 5 MILLIGRAM(S): at 11:34

## 2017-09-25 RX ADMIN — Medication 5 MILLIGRAM(S): at 18:11

## 2017-09-25 RX ADMIN — DORZOLAMIDE HYDROCHLORIDE TIMOLOL MALEATE 1 DROP(S): 20; 5 SOLUTION/ DROPS OPHTHALMIC at 06:35

## 2017-09-25 RX ADMIN — Medication 1 DROP(S): at 15:12

## 2017-09-25 RX ADMIN — Medication 1 DROP(S): at 21:39

## 2017-09-25 NOTE — PROGRESS NOTE ADULT - SUBJECTIVE AND OBJECTIVE BOX
HPI: 81 y/o female with HTN, anemia, colon cancer s/p sigmoid resection. Medicine consult requested fro hypokalemia and HTN.    9/25/17: Feels better - abd pain is controlled; no n/v; yousif clears fine; still no flatus, ambulating and voiding fine  No CP, SOB, F/C    REVIEW OF SYSTEMS:   All 10 systems reviewed in detailed and found to be negative with the exception of what has already been described above        PHYSICAL EXAM:    Vital Signs Last 24 Hrs  T(C): 36.3 (25 Sep 2017 06:29), Max: 36.9 (24 Sep 2017 13:03)  T(F): 97.4 (25 Sep 2017 06:29), Max: 98.5 (24 Sep 2017 13:03)  HR: 69 (25 Sep 2017 10:00) (68 - 88)  BP: 140/58 (25 Sep 2017 10:00) (118/72 - 165/80)  BP(mean): 78 (25 Sep 2017 10:00) (64 - 103)  RR: 13 (25 Sep 2017 10:00) (10 - 20)  SpO2: 97% (25 Sep 2017 10:00) (91% - 100%)      GEN: A and O, NAD, mood stable  HEENT:   NC/AT , EOMI, NC/AT    NECK:   supple    CV:  +S1, +S2, regular, POS ELGIN    RESP:   lungs clear to auscultation bilaterally, SCATTERED CRACKLES AT THE BASES    GI:  abdomen SOFTLY DISTENDED, DRESSING C/D/I, HYPOACTIVE BS    RECTAL:  not examined    :  FLORES OUT    MSK:   normal muscle tone, no atrophy, no rigidity, no contractions    EXT:   no clubbing, no cyanosis, no edema, no calf pain, swelling or erythema    VASCULAR:  pulses equal and symmetric in the upper and lower extremities    NEURO:  AAOX3, no focal neurological deficits, follows all commands, able to move extremities spontaneously    SKIN:  no ulcers, lesions or rashes    LABS/IMAGING:                            10.7   6.3   )-----------( 142      ( 25 Sep 2017 06:26 )             32.6     09-25    143  |  108  |  5<L>  ----------------------------<  112<H>  3.7   |  27  |  0.49<L>    Ca    8.5      25 Sep 2017 06:26      MEDICATIONS  (STANDING):  sodium chloride 0.9% lock flush 3 milliLiter(s) IV Push every 8 hours  heparin  Injectable 5000 Unit(s) SubCutaneous every 12 hours  metoprolol Injectable 5 milliGRAM(s) IV Push every 6 hours  pilocarpine 4% Solution 1 Drop(s) Both EYES three times a day  dorzolamide 2%/timolol 0.5% Ophthalmic Solution 1 Drop(s) Both EYES two times a day  latanoprost 0.005% Ophthalmic Solution 1 Drop(s) Both EYES daily  pantoprazole    Tablet 40 milliGRAM(s) Oral before breakfast  dextrose 5% + sodium chloride 0.45% with potassium chloride 20 mEq/L 1000 milliLiter(s) (50 mL/Hr) IV Continuous <Continuous>  amLODIPine   Tablet 10 milliGRAM(s) Oral daily    MEDICATIONS  (PRN):  oxyCODONE    5 mG/acetaminophen 325 mG 1 Tablet(s) Oral every 4 hours PRN Mild Pain (1 - 3)  oxyCODONE    5 mG/acetaminophen 325 mG 2 Tablet(s) Oral every 6 hours PRN Moderate Pain (4 - 6)  HYDROmorphone  Injectable 0.5 milliGRAM(s) IV Push every 4 hours PRN Severe Pain  ondansetron Injectable 4 milliGRAM(s) IV Push every 6 hours PRN Nausea  enalaprilat Injectable 1.25 milliGRAM(s) IV Push every 6 hours PRN for sbp>160

## 2017-09-25 NOTE — CHART NOTE - NSCHARTNOTEFT_GEN_A_CORE
INTERVAL HPI/OVERNIGHT EVENTS: No significant overnight events. + Void, tolerated clears, no N/V, no flatus/BM + ambulation, no c/o pain    Patient is a 82yFemale  MEDICATIONS  (STANDING):  sodium chloride 0.9% lock flush 3 milliLiter(s) IV Push every 8 hours  heparin  Injectable 5000 Unit(s) SubCutaneous every 12 hours  metoprolol Injectable 5 milliGRAM(s) IV Push every 6 hours  pilocarpine 4% Solution 1 Drop(s) Both EYES three times a day  dorzolamide 2%/timolol 0.5% Ophthalmic Solution 1 Drop(s) Both EYES two times a day  latanoprost 0.005% Ophthalmic Solution 1 Drop(s) Both EYES daily  amLODIPine   Tablet 5 milliGRAM(s) Oral daily  pantoprazole    Tablet 40 milliGRAM(s) Oral before breakfast  dextrose 5% + sodium chloride 0.45% with potassium chloride 20 mEq/L 1000 milliLiter(s) (50 mL/Hr) IV Continuous <Continuous>    MEDICATIONS  (PRN):  oxyCODONE    5 mG/acetaminophen 325 mG 1 Tablet(s) Oral every 4 hours PRN Mild Pain (1 - 3)  oxyCODONE    5 mG/acetaminophen 325 mG 2 Tablet(s) Oral every 6 hours PRN Moderate Pain (4 - 6)  HYDROmorphone  Injectable 0.5 milliGRAM(s) IV Push every 4 hours PRN Severe Pain  ondansetron Injectable 4 milliGRAM(s) IV Push every 6 hours PRN Nausea  enalaprilat Injectable 1.25 milliGRAM(s) IV Push every 6 hours PRN for sbp>160      Allergies    No Known Allergies    Vital Signs Last 24 Hrs  T(C): 36.3 (25 Sep 2017 06:29), Max: 36.9 (24 Sep 2017 08:00)  T(F): 97.4 (25 Sep 2017 06:29), Max: 98.5 (24 Sep 2017 08:00)  HR: 70 (25 Sep 2017 06:00) (68 - 88)  BP: 144/63 (25 Sep 2017 06:00) (118/72 - 164/94)  BP(mean): 82 (25 Sep 2017 06:00) (64 - 110)  RR: 10 (25 Sep 2017 06:00) (10 - 27)  SpO2: 96% (25 Sep 2017 06:00) (91% - 100%)  09-24 @ 07:01  -  09-25 @ 07:00  --------------------------------------------------------  IN: 1200 mL / OUT: 1000 mL / NET: 200 mL        PHYSICAL EXAM:      Constitutional: WDWN NAD      Respiratory: clear Bilat    Cardiovascular: reg S1 S2    Gastrointestinal: soft, + BS, wounds CDI, dressings removed, no R/G, appropriate incisional tenderness, no distention, no HSM, no peritoneal signs,     Extremities: warm no C/C/E        LABS:                        10.7   6.3   )-----------( 142      ( 25 Sep 2017 06:26 )             32.6     09-25    143  |  108  |  5<L>  ----------------------------<  112<H>  3.7   |  27  |  0.49<L>    Ca    8.5      25 Sep 2017 06:26    A/P: Stable POD #3 s/p left colectomy for splenic flexure cancer    -await bowel function  -encourage ambulation/IS  -clears -adv to LRD with bowel fucntion  - monitor BP

## 2017-09-25 NOTE — PROGRESS NOTE ADULT - PROBLEM SELECTOR PLAN 2
ACUTE ON CHRONIC DUE TO BLOOD LOSS  MONITOR H/H  STABLE  THROMBOCYTOPENIA - SECONDARY TO BLEEDING, CONSUMPTION

## 2017-09-25 NOTE — PROGRESS NOTE ADULT - PROBLEM SELECTOR PLAN 4
PT NONCOMPLIANT WITH BP MEDS PRIOR TO SURGERY  CONT METOPROLOL AND ADDED VASOTEC IV FOR NOW  RESTARTED NORVASC BUT WILL INCREASE DOSE TO 10MG DAILY AND MONITOR  BP BETTER.

## 2017-09-25 NOTE — PROGRESS NOTE ADULT - PROBLEM SELECTOR PLAN 1
POD#2  PAIN CONTROL  MOBILIZE - OOB   AWAIT BOWEL FUNCTION TO RETURN  ENCOURAGED INCENTIVE SPIROMETRY  RAMSES CLEARS AND ADVANCING TO LOW RESIDUE DIET  DVT PROPHY - SQ HEPARIN, VENODYNES.

## 2017-09-26 LAB — SURGICAL PATHOLOGY FINAL REPORT - CH: SIGNIFICANT CHANGE UP

## 2017-09-26 RX ORDER — METOPROLOL TARTRATE 50 MG
25 TABLET ORAL
Qty: 0 | Refills: 0 | Status: DISCONTINUED | OUTPATIENT
Start: 2017-09-26 | End: 2017-09-27

## 2017-09-26 RX ADMIN — DORZOLAMIDE HYDROCHLORIDE TIMOLOL MALEATE 1 DROP(S): 20; 5 SOLUTION/ DROPS OPHTHALMIC at 05:25

## 2017-09-26 RX ADMIN — Medication 5 MILLIGRAM(S): at 05:24

## 2017-09-26 RX ADMIN — SODIUM CHLORIDE 3 MILLILITER(S): 9 INJECTION INTRAMUSCULAR; INTRAVENOUS; SUBCUTANEOUS at 05:25

## 2017-09-26 RX ADMIN — Medication 1 DROP(S): at 21:01

## 2017-09-26 RX ADMIN — HEPARIN SODIUM 5000 UNIT(S): 5000 INJECTION INTRAVENOUS; SUBCUTANEOUS at 05:23

## 2017-09-26 RX ADMIN — DEXTROSE MONOHYDRATE, SODIUM CHLORIDE, AND POTASSIUM CHLORIDE 50 MILLILITER(S): 50; .745; 4.5 INJECTION, SOLUTION INTRAVENOUS at 00:25

## 2017-09-26 RX ADMIN — PANTOPRAZOLE SODIUM 40 MILLIGRAM(S): 20 TABLET, DELAYED RELEASE ORAL at 06:53

## 2017-09-26 RX ADMIN — Medication 25 MILLIGRAM(S): at 18:17

## 2017-09-26 RX ADMIN — OXYCODONE AND ACETAMINOPHEN 1 TABLET(S): 5; 325 TABLET ORAL at 12:45

## 2017-09-26 RX ADMIN — DORZOLAMIDE HYDROCHLORIDE TIMOLOL MALEATE 1 DROP(S): 20; 5 SOLUTION/ DROPS OPHTHALMIC at 18:17

## 2017-09-26 RX ADMIN — Medication 1 DROP(S): at 05:24

## 2017-09-26 RX ADMIN — AMLODIPINE BESYLATE 10 MILLIGRAM(S): 2.5 TABLET ORAL at 09:08

## 2017-09-26 RX ADMIN — OXYCODONE AND ACETAMINOPHEN 1 TABLET(S): 5; 325 TABLET ORAL at 13:45

## 2017-09-26 RX ADMIN — LATANOPROST 1 DROP(S): 0.05 SOLUTION/ DROPS OPHTHALMIC; TOPICAL at 21:08

## 2017-09-26 RX ADMIN — Medication 1 DROP(S): at 13:58

## 2017-09-26 RX ADMIN — Medication 5 MILLIGRAM(S): at 00:23

## 2017-09-26 RX ADMIN — SODIUM CHLORIDE 3 MILLILITER(S): 9 INJECTION INTRAMUSCULAR; INTRAVENOUS; SUBCUTANEOUS at 21:00

## 2017-09-26 RX ADMIN — HEPARIN SODIUM 5000 UNIT(S): 5000 INJECTION INTRAVENOUS; SUBCUTANEOUS at 18:17

## 2017-09-26 NOTE — PROGRESS NOTE ADULT - SUBJECTIVE AND OBJECTIVE BOX
HPI: 83 y/o female with HTN, anemia, colon cancer s/p sigmoid resection. Medicine consult requested fro hypokalemia and HTN.    9/25/17: Feels better - abd pain is controlled; no n/v; yousif clears fine; still no flatus, ambulating and voiding fine  No CP, SOB, F/C  9/26/17:  Having flatus -- advance to low residue diet and doing well - no n/v/f/c; no cp, sob, ambulating fine      REVIEW OF SYSTEMS:   All 10 systems reviewed in detailed and found to be negative with the exception of what has already been described above        PHYSICAL EXAM:    Vital Signs Last 24 Hrs  T(C): 36.9 (26 Sep 2017 07:38), Max: 36.9 (26 Sep 2017 07:38)  T(F): 98.5 (26 Sep 2017 07:38), Max: 98.5 (26 Sep 2017 07:38)  HR: 75 (26 Sep 2017 08:00) (69 - 84)  BP: 150/69 (26 Sep 2017 08:00) (117/76 - 156/63)  BP(mean): 88 (26 Sep 2017 08:00) (76 - 96)  RR: 16 (26 Sep 2017 08:00) (12 - 23)  SpO2: 98% (26 Sep 2017 08:00) (93% - 98%)    GEN: A and O, NAD, mood stable  HEENT:   NC/AT , EOMI, NC/AT    NECK:   supple    CV:  +S1, +S2, regular, POS ELGIN    RESP:   lungs clear to auscultation bilaterally, SCATTERED CRACKLES AT THE BASES    GI:  abdomen SOFTLY DISTENDED, DRESSING C/D/I, Better BS    RECTAL:  not examined    :  FLORES OUT    MSK:   normal muscle tone, no atrophy, no rigidity, no contractions    EXT:   no clubbing, no cyanosis, no edema, no calf pain, swelling or erythema    VASCULAR:  pulses equal and symmetric in the upper and lower extremities    NEURO:  AAOX3, no focal neurological deficits, follows all commands, able to move extremities spontaneously    SKIN:  no ulcers, lesions or rashes    LABS/IMAGING:                              10.7   6.3   )-----------( 142      ( 25 Sep 2017 06:26 )             32.6     09-25    143  |  108  |  5<L>  ----------------------------<  112<H>  3.7   |  27  |  0.49<L>    Ca    8.5      25 Sep 2017 06:26                MEDICATIONS  (STANDING):  sodium chloride 0.9% lock flush 3 milliLiter(s) IV Push every 8 hours  heparin  Injectable 5000 Unit(s) SubCutaneous every 12 hours  metoprolol Injectable 5 milliGRAM(s) IV Push every 6 hours  pilocarpine 4% Solution 1 Drop(s) Both EYES three times a day  dorzolamide 2%/timolol 0.5% Ophthalmic Solution 1 Drop(s) Both EYES two times a day  latanoprost 0.005% Ophthalmic Solution 1 Drop(s) Both EYES daily  pantoprazole    Tablet 40 milliGRAM(s) Oral before breakfast  amLODIPine   Tablet 10 milliGRAM(s) Oral daily    MEDICATIONS  (PRN):  oxyCODONE    5 mG/acetaminophen 325 mG 1 Tablet(s) Oral every 4 hours PRN Mild Pain (1 - 3)  oxyCODONE    5 mG/acetaminophen 325 mG 2 Tablet(s) Oral every 6 hours PRN Moderate Pain (4 - 6)  HYDROmorphone  Injectable 0.5 milliGRAM(s) IV Push every 4 hours PRN Severe Pain  ondansetron Injectable 4 milliGRAM(s) IV Push every 6 hours PRN Nausea  enalaprilat Injectable 1.25 milliGRAM(s) IV Push every 6 hours PRN for sbp>160

## 2017-09-26 NOTE — PROGRESS NOTE ADULT - PROBLEM SELECTOR PLAN 4
PT NONCOMPLIANT WITH BP MEDS PRIOR TO SURGERY  CONT METOPROLOL  - SWITCH TO PO  RESTARTED NORVASC BUT WILL INCREASE DOSE TO 10MG DAILY AND MONITOR  BP BETTER.  BP BETTER -- WILL GIVE IT MORE TIME BEFORE ADJUSTING MEDS FURTHER

## 2017-09-26 NOTE — CHART NOTE - NSCHARTNOTEFT_GEN_A_CORE
INTERVAL HPI/OVERNIGHT EVENTS: No significant overnight events. Pt OOB to chair this am, ambulated and washed performing self care. Tolerated clears, + flatus, no BM, no N/V    Patient is a 82yFemale  MEDICATIONS  (STANDING):  sodium chloride 0.9% lock flush 3 milliLiter(s) IV Push every 8 hours  heparin  Injectable 5000 Unit(s) SubCutaneous every 12 hours  metoprolol Injectable 5 milliGRAM(s) IV Push every 6 hours  pilocarpine 4% Solution 1 Drop(s) Both EYES three times a day  dorzolamide 2%/timolol 0.5% Ophthalmic Solution 1 Drop(s) Both EYES two times a day  latanoprost 0.005% Ophthalmic Solution 1 Drop(s) Both EYES daily  pantoprazole    Tablet 40 milliGRAM(s) Oral before breakfast  amLODIPine   Tablet 10 milliGRAM(s) Oral daily    MEDICATIONS  (PRN):  oxyCODONE    5 mG/acetaminophen 325 mG 1 Tablet(s) Oral every 4 hours PRN Mild Pain (1 - 3)  oxyCODONE    5 mG/acetaminophen 325 mG 2 Tablet(s) Oral every 6 hours PRN Moderate Pain (4 - 6)  HYDROmorphone  Injectable 0.5 milliGRAM(s) IV Push every 4 hours PRN Severe Pain  ondansetron Injectable 4 milliGRAM(s) IV Push every 6 hours PRN Nausea  enalaprilat Injectable 1.25 milliGRAM(s) IV Push every 6 hours PRN for sbp>160      Allergies    No Known Allergies    Intolerances    Vital Signs Last 24 Hrs  T(C): 36.3 (26 Sep 2017 06:19), Max: 36.5 (25 Sep 2017 11:56)  T(F): 97.4 (26 Sep 2017 06:19), Max: 97.7 (25 Sep 2017 11:56)  HR: 75 (26 Sep 2017 05:00) (69 - 85)  BP: 140/58 (26 Sep 2017 05:00) (117/76 - 165/80)  BP(mean): 78 (26 Sep 2017 05:00) (76 - 103)  RR: 13 (26 Sep 2017 05:00) (12 - 23)  SpO2: 93% (26 Sep 2017 03:00) (93% - 97%)  09-24 @ 07:01  -  09-25 @ 07:00  --------------------------------------------------------  IN: 1200 mL / OUT: 1000 mL / NET: 200 mL    09-25 @ 07:01  -  09-26 @ 06:29  --------------------------------------------------------  IN: 0 mL / OUT: 2225 mL / NET: -2225 mL        PHYSICAL EXAM:      Constitutional: WDWN NAD    Respiratory: clear bilat,     Cardiovascular: reg S1 S2    Gastrointestinal: abd, soft, appropriate incisional tenderness, + BS, no R/G, no HSM, wounds CDI    Extremities: Warm, no C/C/E        LABS:                        10.7   6.3   )-----------( 142      ( 25 Sep 2017 06:26 )             32.6     09-25    143  |  108  |  5<L>  ----------------------------<  112<H>  3.7   |  27  |  0.49<L>    Ca    8.5      25 Sep 2017 06:26    A/P- Stable POD #4    OK for transfer to floor  Adv to LRD  d/c planning 9/27/17  encourage ambulation/IS

## 2017-09-26 NOTE — PROGRESS NOTE ADULT - PROBLEM SELECTOR PLAN 1
POD#3  PAIN CONTROL  MOBILIZE - OOB   doing well - better BS; RAMSES DIET  ENCOURAGED INCENTIVE SPIROMETRY  RAMSES CLEARS AND ADVANCING TO LOW RESIDUE DIET  DVT PROPHY - SQ HEPARIN, VENODYNES.

## 2017-09-27 ENCOUNTER — TRANSCRIPTION ENCOUNTER (OUTPATIENT)
Age: 82
End: 2017-09-27

## 2017-09-27 VITALS
HEART RATE: 73 BPM | OXYGEN SATURATION: 97 % | SYSTOLIC BLOOD PRESSURE: 131 MMHG | RESPIRATION RATE: 16 BRPM | TEMPERATURE: 98 F | DIASTOLIC BLOOD PRESSURE: 61 MMHG

## 2017-09-27 RX ORDER — INFLUENZA VIRUS VACCINE 15; 15; 15; 15 UG/.5ML; UG/.5ML; UG/.5ML; UG/.5ML
0.5 SUSPENSION INTRAMUSCULAR ONCE
Qty: 0 | Refills: 0 | Status: DISCONTINUED | OUTPATIENT
Start: 2017-09-27 | End: 2017-09-27

## 2017-09-27 RX ORDER — AMLODIPINE BESYLATE 2.5 MG/1
1 TABLET ORAL
Qty: 0 | Refills: 0 | COMMUNITY

## 2017-09-27 RX ORDER — DORZOLAMIDE HYDROCHLORIDE TIMOLOL MALEATE 20; 5 MG/ML; MG/ML
1 SOLUTION/ DROPS OPHTHALMIC
Qty: 0 | Refills: 0 | COMMUNITY

## 2017-09-27 RX ORDER — LATANOPROST 0.05 MG/ML
1 SOLUTION/ DROPS OPHTHALMIC; TOPICAL
Qty: 0 | Refills: 0 | COMMUNITY

## 2017-09-27 RX ORDER — PILOCARPINE HCL 4 %
1 DROPS OPHTHALMIC (EYE)
Qty: 0 | Refills: 0 | COMMUNITY

## 2017-09-27 RX ORDER — METOPROLOL TARTRATE 50 MG
1 TABLET ORAL
Qty: 60 | Refills: 0 | OUTPATIENT
Start: 2017-09-27

## 2017-09-27 RX ORDER — INFLUENZA VIRUS VACCINE 15; 15; 15; 15 UG/.5ML; UG/.5ML; UG/.5ML; UG/.5ML
0.5 SUSPENSION INTRAMUSCULAR ONCE
Qty: 0 | Refills: 0 | Status: COMPLETED | OUTPATIENT
Start: 2017-09-27 | End: 2017-09-27

## 2017-09-27 RX ORDER — AMLODIPINE BESYLATE 2.5 MG/1
1 TABLET ORAL
Qty: 30 | Refills: 0 | OUTPATIENT
Start: 2017-09-27 | End: 2017-10-27

## 2017-09-27 RX ORDER — OXYCODONE HYDROCHLORIDE 5 MG/1
1 TABLET ORAL
Qty: 30 | Refills: 0 | OUTPATIENT
Start: 2017-09-27

## 2017-09-27 RX ADMIN — PANTOPRAZOLE SODIUM 40 MILLIGRAM(S): 20 TABLET, DELAYED RELEASE ORAL at 08:01

## 2017-09-27 RX ADMIN — AMLODIPINE BESYLATE 10 MILLIGRAM(S): 2.5 TABLET ORAL at 05:46

## 2017-09-27 RX ADMIN — SODIUM CHLORIDE 3 MILLILITER(S): 9 INJECTION INTRAMUSCULAR; INTRAVENOUS; SUBCUTANEOUS at 05:50

## 2017-09-27 RX ADMIN — Medication 1 DROP(S): at 06:18

## 2017-09-27 RX ADMIN — Medication 25 MILLIGRAM(S): at 05:46

## 2017-09-27 RX ADMIN — DORZOLAMIDE HYDROCHLORIDE TIMOLOL MALEATE 1 DROP(S): 20; 5 SOLUTION/ DROPS OPHTHALMIC at 05:50

## 2017-09-27 RX ADMIN — INFLUENZA VIRUS VACCINE 0.5 MILLILITER(S): 15; 15; 15; 15 SUSPENSION INTRAMUSCULAR at 10:18

## 2017-09-27 RX ADMIN — HEPARIN SODIUM 5000 UNIT(S): 5000 INJECTION INTRAVENOUS; SUBCUTANEOUS at 05:46

## 2017-09-27 NOTE — DISCHARGE NOTE ADULT - MEDICATION SUMMARY - MEDICATIONS TO TAKE
I will START or STAY ON the medications listed below when I get home from the hospital:    oxyCODONE 5 mg oral tablet  -- 1 tab(s) by mouth every 4 hours, As Needed -for moderate pain MDD:6  -- Caution federal law prohibits the transfer of this drug to any person other  than the person for whom it was prescribed.  It is very important that you take or use this exactly as directed.  Do not skip doses or discontinue unless directed by your doctor.  May cause drowsiness.  Alcohol may intensify this effect.  Use care when operating dangerous machinery.  This prescription cannot be refilled.  Using more of this medication than prescribed may cause serious breathing problems.    -- Indication: For Colon cancer    metoprolol tartrate 25 mg oral tablet  -- 1 tab(s) by mouth 2 times a day  -- Indication: For Colon cancer    amLODIPine 10 mg oral tablet  -- 1 tab(s) by mouth once a day  -- Indication: For Colon cancer    ferrous sulfate 200 mg (65 mg elemental iron) oral tablet  -- 1 dose(s) by mouth once a day  -- Indication: For Colon cancer    Calcium 600+D 600 mg-200 intl units oral tablet  -- 1 tab(s) by mouth once a day  -- Indication: For Colon cancer    multivitamin  --     -- Indication: For Colon cancer    Eye Health Formula oral capsule  -- 1 cap(s) by mouth once a day  -- Indication: For Colon cancer    folic acid 1 mg oral tablet  -- 1 tab(s) by mouth once a day  -- Indication: For Colon cancer

## 2017-09-27 NOTE — DISCHARGE NOTE ADULT - PATIENT PORTAL LINK FT
“You can access the [facility] Patient Portal, offered by [facility], by registering with the following website: [email or web address]”

## 2017-09-27 NOTE — DISCHARGE NOTE ADULT - CARE PROVIDER_API CALL
Luisana Lynn (MD; MPH), ColonRectal Surgery; Surgery  [address] [place] [address]  Phone: [phone]  Fax: [phone]

## 2017-09-27 NOTE — DISCHARGE NOTE ADULT - CARE PLAN
Principal Discharge DX:	Overlapping malignant neoplasm of colon  Goal:	recovery  Instructions for follow-up, activity and diet:	Follow up with Dr. Mueller in 1 week. Your abdominal staples will be removed in the office in follow up.

## 2017-09-27 NOTE — DISCHARGE NOTE ADULT - PLAN OF CARE
recovery Follow up with Dr. Mueller in 1 week. Your abdominal staples will be removed in the office in follow up.

## 2017-09-27 NOTE — CHART NOTE - NSCHARTNOTEFT_GEN_A_CORE
INTERVAL HPI/OVERNIGHT EVENTS: Pt sitting on edge of bed. Tolerated LRD, no N/V, + flatus, no BM, no significant overnight events    Patient is a 82yFemale  MEDICATIONS  (STANDING):  sodium chloride 0.9% lock flush 3 milliLiter(s) IV Push every 8 hours  heparin  Injectable 5000 Unit(s) SubCutaneous every 12 hours  pilocarpine 4% Solution 1 Drop(s) Both EYES three times a day  dorzolamide 2%/timolol 0.5% Ophthalmic Solution 1 Drop(s) Both EYES two times a day  latanoprost 0.005% Ophthalmic Solution 1 Drop(s) Both EYES daily  pantoprazole    Tablet 40 milliGRAM(s) Oral before breakfast  amLODIPine   Tablet 10 milliGRAM(s) Oral daily  metoprolol 25 milliGRAM(s) Oral two times a day    MEDICATIONS  (PRN):  oxyCODONE    5 mG/acetaminophen 325 mG 1 Tablet(s) Oral every 4 hours PRN Mild Pain (1 - 3)  oxyCODONE    5 mG/acetaminophen 325 mG 2 Tablet(s) Oral every 6 hours PRN Moderate Pain (4 - 6)  HYDROmorphone  Injectable 0.5 milliGRAM(s) IV Push every 4 hours PRN Severe Pain  ondansetron Injectable 4 milliGRAM(s) IV Push every 6 hours PRN Nausea      Allergies    No Known Allergies    Intolerances      Vital Signs Last 24 Hrs  T(C): 37.4 (27 Sep 2017 04:22), Max: 37.4 (27 Sep 2017 04:22)  T(F): 99.3 (27 Sep 2017 04:22), Max: 99.3 (27 Sep 2017 04:22)  HR: 74 (27 Sep 2017 04:22) (74 - 100)  BP: 150/67 (27 Sep 2017 04:22) (114/62 - 151/66)  BP(mean): 78 (26 Sep 2017 16:00) (72 - 91)  RR: 18 (27 Sep 2017 04:22) (14 - 19)  SpO2: 98% (27 Sep 2017 04:22) (96% - 98%)  09-26 @ 07:01  -  09-27 @ 07:00  --------------------------------------------------------  IN: 0 mL / OUT: 575 mL / NET: -575 mL        PHYSICAL EXAM:      Constitutional: WDWN NAD    Respiratory: Clear bilat    Cardiovascular: reg S1 S2    Gastrointestinal: soft, NT/ND +BS, no R/G, no HSM, no peritoneal signs, wounds CDI    Extremities: warm no C/C/E    A/P Stable    ok for d/c home  f/u in office one week  resume all home meds  low residue diet  activity and diet restrictions and limitations reviewed

## 2017-09-29 DIAGNOSIS — Z91.14 PATIENT'S OTHER NONCOMPLIANCE WITH MEDICATION REGIMEN: ICD-10-CM

## 2017-09-29 DIAGNOSIS — I10 ESSENTIAL (PRIMARY) HYPERTENSION: ICD-10-CM

## 2017-09-29 DIAGNOSIS — H40.9 UNSPECIFIED GLAUCOMA: ICD-10-CM

## 2017-09-29 DIAGNOSIS — D49.0 NEOPLASM OF UNSPECIFIED BEHAVIOR OF DIGESTIVE SYSTEM: ICD-10-CM

## 2017-09-29 DIAGNOSIS — Z88.6 ALLERGY STATUS TO ANALGESIC AGENT: ICD-10-CM

## 2017-09-29 DIAGNOSIS — D62 ACUTE POSTHEMORRHAGIC ANEMIA: ICD-10-CM

## 2017-09-29 DIAGNOSIS — C18.5 MALIGNANT NEOPLASM OF SPLENIC FLEXURE: ICD-10-CM

## 2017-09-29 DIAGNOSIS — K27.7 CHRONIC PEPTIC ULCER, SITE UNSPECIFIED, WITHOUT HEMORRHAGE OR PERFORATION: ICD-10-CM

## 2017-09-29 DIAGNOSIS — D69.6 THROMBOCYTOPENIA, UNSPECIFIED: ICD-10-CM

## 2017-09-29 DIAGNOSIS — E87.6 HYPOKALEMIA: ICD-10-CM

## 2017-10-04 ENCOUNTER — RECORD ABSTRACTING (OUTPATIENT)
Age: 82
End: 2017-10-04

## 2017-10-04 DIAGNOSIS — E56.9 VITAMIN DEFICIENCY, UNSPECIFIED: ICD-10-CM

## 2017-10-04 DIAGNOSIS — Z87.11 PERSONAL HISTORY OF PEPTIC ULCER DISEASE: ICD-10-CM

## 2017-10-04 DIAGNOSIS — Z87.898 PERSONAL HISTORY OF OTHER SPECIFIED CONDITIONS: ICD-10-CM

## 2017-10-06 ENCOUNTER — LABORATORY RESULT (OUTPATIENT)
Age: 82
End: 2017-10-06

## 2017-10-06 ENCOUNTER — APPOINTMENT (OUTPATIENT)
Dept: HEMATOLOGY ONCOLOGY | Facility: CLINIC | Age: 82
End: 2017-10-06
Payer: MEDICARE

## 2017-10-06 VITALS
HEIGHT: 62 IN | HEART RATE: 71 BPM | TEMPERATURE: 98.3 F | BODY MASS INDEX: 23.28 KG/M2 | SYSTOLIC BLOOD PRESSURE: 147 MMHG | WEIGHT: 126.5 LBS | DIASTOLIC BLOOD PRESSURE: 76 MMHG

## 2017-10-06 LAB
HCT VFR BLD CALC: 36.9 %
HGB BLD-MCNC: 12.7 G/DL
MCHC RBC-ENTMCNC: 27.1 PG
MCHC RBC-ENTMCNC: 34.4 GM/DL
MCV RBC AUTO: 78.6 FL
PLATELET # BLD AUTO: 253 K/UL
RBC # BLD: 4.69 M/UL
RBC # FLD: 17.7 %
WBC # FLD AUTO: 5.8 K/UL

## 2017-10-06 PROCEDURE — 85025 COMPLETE CBC W/AUTO DIFF WBC: CPT

## 2017-10-06 PROCEDURE — 99205 OFFICE O/P NEW HI 60 MIN: CPT | Mod: 25

## 2017-10-06 PROCEDURE — 36415 COLL VENOUS BLD VENIPUNCTURE: CPT

## 2017-10-06 RX ORDER — DORZOLAMIDE HYDROCHLORIDE TIMOLOL MALEATE 20; 5 MG/ML; MG/ML
22.3-6.8 SOLUTION/ DROPS OPHTHALMIC
Qty: 20 | Refills: 0 | Status: ACTIVE | COMMUNITY
Start: 2017-06-09

## 2017-10-06 RX ORDER — LATANOPROST/PF 0.005 %
0.01 DROPS OPHTHALMIC (EYE)
Qty: 8 | Refills: 0 | Status: ACTIVE | COMMUNITY
Start: 2016-11-02

## 2017-10-06 RX ORDER — PILOCARPINE HYDROCHLORIDE OPHTHALMIC SOLUTION 40 MG/ML
4 SOLUTION/ DROPS OPHTHALMIC
Qty: 30 | Refills: 0 | Status: ACTIVE | COMMUNITY
Start: 2017-07-19

## 2017-10-09 LAB
ALBUMIN SERPL ELPH-MCNC: 3.9 G/DL
ALP BLD-CCNC: 76 U/L
ALT SERPL-CCNC: 12 U/L
ANION GAP SERPL CALC-SCNC: 19 MMOL/L
AST SERPL-CCNC: 17 U/L
BILIRUB SERPL-MCNC: 0.3 MG/DL
BUN SERPL-MCNC: 11 MG/DL
CALCIUM SERPL-MCNC: 10.5 MG/DL
CEA SERPL-MCNC: 8.2 NG/ML
CHLORIDE SERPL-SCNC: 103 MMOL/L
CO2 SERPL-SCNC: 21 MMOL/L
CREAT SERPL-MCNC: 0.65 MG/DL
GLUCOSE SERPL-MCNC: 109 MG/DL
POTASSIUM SERPL-SCNC: 4.1 MMOL/L
PROT SERPL-MCNC: 7.2 G/DL
SODIUM SERPL-SCNC: 143 MMOL/L

## 2017-10-12 DIAGNOSIS — Z87.448 PERSONAL HISTORY OF OTHER DISEASES OF URINARY SYSTEM: ICD-10-CM

## 2017-10-12 DIAGNOSIS — M81.0 AGE-RELATED OSTEOPOROSIS W/OUT CURRENT PATHOLOGICAL FRACTURE: ICD-10-CM

## 2017-10-12 DIAGNOSIS — Z83.1 FAMILY HISTORY OF OTHER INFECTIOUS AND PARASITIC DISEASES: ICD-10-CM

## 2017-10-12 DIAGNOSIS — H40.9 UNSPECIFIED GLAUCOMA: ICD-10-CM

## 2017-10-25 RX ORDER — NEOMYCIN SULFATE 500 MG/1
500 TABLET ORAL
Qty: 3 | Refills: 0 | Status: DISCONTINUED | COMMUNITY
Start: 2017-08-31 | End: 2017-10-25

## 2017-10-25 RX ORDER — AMLODIPINE BESYLATE 5 MG/1
5 TABLET ORAL
Qty: 30 | Refills: 0 | Status: DISCONTINUED | COMMUNITY
Start: 2017-08-04 | End: 2017-10-25

## 2017-10-25 RX ORDER — METRONIDAZOLE 500 MG/1
500 TABLET ORAL
Qty: 3 | Refills: 0 | Status: DISCONTINUED | COMMUNITY
Start: 2017-08-31 | End: 2017-10-25

## 2017-10-25 RX ORDER — POLYETHYLENE GLYCOL-3350 AND ELECTROLYTES 236; 6.74; 5.86; 2.97; 22.74 G/274.31G; G/274.31G; G/274.31G; G/274.31G; G/274.31G
236 POWDER, FOR SOLUTION ORAL
Qty: 4000 | Refills: 0 | Status: DISCONTINUED | COMMUNITY
Start: 2017-08-09 | End: 2017-10-25

## 2017-10-27 ENCOUNTER — APPOINTMENT (OUTPATIENT)
Dept: HEMATOLOGY ONCOLOGY | Facility: CLINIC | Age: 82
End: 2017-10-27
Payer: MEDICARE

## 2017-10-27 ENCOUNTER — LABORATORY RESULT (OUTPATIENT)
Age: 82
End: 2017-10-27

## 2017-10-27 LAB
HCT VFR BLD CALC: 36.6 %
HGB BLD-MCNC: 12.6 G/DL
MCHC RBC-ENTMCNC: 27.2 PG
MCHC RBC-ENTMCNC: 34.4 GM/DL
MCV RBC AUTO: 79 FL
PLATELET # BLD AUTO: 162 K/UL
RBC # BLD: 4.63 M/UL
RBC # FLD: 17.1 %
WBC # FLD AUTO: 5.1 K/UL

## 2017-10-27 PROCEDURE — 36415 COLL VENOUS BLD VENIPUNCTURE: CPT

## 2017-10-27 PROCEDURE — 99215 OFFICE O/P EST HI 40 MIN: CPT | Mod: 25

## 2017-10-27 PROCEDURE — 85025 COMPLETE CBC W/AUTO DIFF WBC: CPT

## 2017-10-27 RX ORDER — FOLIC ACID 1 MG/1
1 TABLET ORAL
Qty: 45 | Refills: 0 | Status: DISCONTINUED | COMMUNITY
Start: 2017-08-11 | End: 2017-10-27

## 2017-10-27 RX ORDER — OXYCODONE 5 MG/1
5 TABLET ORAL
Qty: 30 | Refills: 0 | Status: DISCONTINUED | COMMUNITY
Start: 2017-09-27 | End: 2017-10-27

## 2017-10-27 RX ORDER — MULTIVIT-MIN/FOLIC ACID/LUTEIN 400-250MCG
TABLET,CHEWABLE ORAL
Refills: 0 | Status: ACTIVE | COMMUNITY

## 2017-10-27 RX ORDER — PANTOPRAZOLE 40 MG/1
40 TABLET, DELAYED RELEASE ORAL
Qty: 30 | Refills: 0 | Status: DISCONTINUED | COMMUNITY
Start: 2017-08-04 | End: 2017-10-27

## 2017-10-27 RX ORDER — FOLIC ACID 1 MG/1
1 TABLET ORAL DAILY
Qty: 30 | Refills: 6 | Status: DISCONTINUED | COMMUNITY
Start: 2017-10-06 | End: 2017-10-27

## 2017-10-27 RX ORDER — CALCIUM CITRATE/VITAMIN D3 315MG-6.25
TABLET ORAL
Refills: 0 | Status: ACTIVE | COMMUNITY

## 2017-10-27 RX ORDER — AMLODIPINE BESYLATE 10 MG/1
10 TABLET ORAL
Qty: 30 | Refills: 0 | Status: DISCONTINUED | COMMUNITY
Start: 2017-09-27 | End: 2017-10-27

## 2017-10-30 LAB
ANION GAP SERPL CALC-SCNC: 15 MMOL/L
BUN SERPL-MCNC: 14 MG/DL
CALCIUM SERPL-MCNC: 10.1 MG/DL
CEA SERPL-MCNC: 6.1 NG/ML
CHLORIDE SERPL-SCNC: 104 MMOL/L
CO2 SERPL-SCNC: 25 MMOL/L
CREAT SERPL-MCNC: 0.58 MG/DL
ERYTHROCYTE [SEDIMENTATION RATE] IN BLOOD BY WESTERGREN METHOD: 4 MM/HR
FERRITIN SERPL-MCNC: 73 NG/ML
FOLATE SERPL-MCNC: >20 NG/ML
GLUCOSE SERPL-MCNC: 103 MG/DL
IRON SATN MFR SERPL: 20 %
IRON SERPL-MCNC: 61 UG/DL
POTASSIUM SERPL-SCNC: 4.6 MMOL/L
SODIUM SERPL-SCNC: 144 MMOL/L
TIBC SERPL-MCNC: 299 UG/DL
UIBC SERPL-MCNC: 238 UG/DL
VIT B12 SERPL-MCNC: 1423 PG/ML

## 2017-11-20 ENCOUNTER — LABORATORY RESULT (OUTPATIENT)
Age: 82
End: 2017-11-20

## 2017-11-20 ENCOUNTER — APPOINTMENT (OUTPATIENT)
Dept: HEMATOLOGY ONCOLOGY | Facility: CLINIC | Age: 82
End: 2017-11-20
Payer: MEDICARE

## 2017-11-20 VITALS
TEMPERATURE: 97.9 F | DIASTOLIC BLOOD PRESSURE: 75 MMHG | BODY MASS INDEX: 24.11 KG/M2 | WEIGHT: 131 LBS | HEART RATE: 73 BPM | SYSTOLIC BLOOD PRESSURE: 123 MMHG | HEIGHT: 62 IN

## 2017-11-20 LAB
HCT VFR BLD CALC: 36.3 %
HGB BLD-MCNC: 12 G/DL
MCHC RBC-ENTMCNC: 27.5 PG
MCHC RBC-ENTMCNC: 33 GM/DL
MCV RBC AUTO: 83.6 FL
PLATELET # BLD AUTO: 240 K/UL
RBC # BLD: 4.35 M/UL
RBC # FLD: 17.6 %
WBC # FLD AUTO: 5.9 K/UL

## 2017-11-20 PROCEDURE — 99214 OFFICE O/P EST MOD 30 MIN: CPT | Mod: 25

## 2017-11-20 PROCEDURE — 85025 COMPLETE CBC W/AUTO DIFF WBC: CPT

## 2017-11-20 PROCEDURE — 36415 COLL VENOUS BLD VENIPUNCTURE: CPT

## 2017-11-20 RX ORDER — METOPROLOL TARTRATE 25 MG/1
25 TABLET, FILM COATED ORAL
Qty: 60 | Refills: 0 | Status: DISCONTINUED | COMMUNITY
Start: 2017-09-27 | End: 2017-11-20

## 2017-11-21 LAB
ALBUMIN SERPL ELPH-MCNC: 3.9 G/DL
ALP BLD-CCNC: 61 U/L
ALT SERPL-CCNC: 9 U/L
ANION GAP SERPL CALC-SCNC: 12 MMOL/L
AST SERPL-CCNC: 15 U/L
BILIRUB SERPL-MCNC: 0.2 MG/DL
BUN SERPL-MCNC: 15 MG/DL
CALCIUM SERPL-MCNC: 9.4 MG/DL
CEA SERPL-MCNC: 6.5 NG/ML
CHLORIDE SERPL-SCNC: 105 MMOL/L
CO2 SERPL-SCNC: 27 MMOL/L
CREAT SERPL-MCNC: 0.62 MG/DL
GLUCOSE SERPL-MCNC: 120 MG/DL
POTASSIUM SERPL-SCNC: 4.3 MMOL/L
PROT SERPL-MCNC: 6.8 G/DL
SODIUM SERPL-SCNC: 144 MMOL/L

## 2017-11-22 ENCOUNTER — RX RENEWAL (OUTPATIENT)
Age: 82
End: 2017-11-22

## 2018-01-12 ENCOUNTER — APPOINTMENT (OUTPATIENT)
Dept: NUCLEAR MEDICINE | Facility: CLINIC | Age: 83
End: 2018-01-12

## 2018-01-20 ENCOUNTER — APPOINTMENT (OUTPATIENT)
Dept: NUCLEAR MEDICINE | Facility: CLINIC | Age: 83
End: 2018-01-20

## 2018-01-20 ENCOUNTER — OUTPATIENT (OUTPATIENT)
Dept: OUTPATIENT SERVICES | Facility: HOSPITAL | Age: 83
LOS: 1 days | End: 2018-01-20
Payer: COMMERCIAL

## 2018-01-20 DIAGNOSIS — Z97.5 PRESENCE OF (INTRAUTERINE) CONTRACEPTIVE DEVICE: Chronic | ICD-10-CM

## 2018-01-20 DIAGNOSIS — R93.3 ABNORMAL FINDINGS ON DIAGNOSTIC IMAGING OF OTHER PARTS OF DIGESTIVE TRACT: Chronic | ICD-10-CM

## 2018-01-20 DIAGNOSIS — C18.9 MALIGNANT NEOPLASM OF COLON, UNSPECIFIED: ICD-10-CM

## 2018-01-20 DIAGNOSIS — Z98.890 OTHER SPECIFIED POSTPROCEDURAL STATES: Chronic | ICD-10-CM

## 2018-01-20 DIAGNOSIS — Z98.49 CATARACT EXTRACTION STATUS, UNSPECIFIED EYE: Chronic | ICD-10-CM

## 2018-01-20 DIAGNOSIS — N63 UNSPECIFIED LUMP IN BREAST: Chronic | ICD-10-CM

## 2018-01-20 PROCEDURE — A9552: CPT

## 2018-01-20 PROCEDURE — 78815 PET IMAGE W/CT SKULL-THIGH: CPT | Mod: 26,PS

## 2018-01-20 PROCEDURE — 78815 PET IMAGE W/CT SKULL-THIGH: CPT

## 2018-01-23 ENCOUNTER — APPOINTMENT (OUTPATIENT)
Dept: HEMATOLOGY ONCOLOGY | Facility: CLINIC | Age: 83
End: 2018-01-23
Payer: MEDICARE

## 2018-01-23 ENCOUNTER — LABORATORY RESULT (OUTPATIENT)
Age: 83
End: 2018-01-23

## 2018-01-23 VITALS
HEART RATE: 78 BPM | DIASTOLIC BLOOD PRESSURE: 66 MMHG | SYSTOLIC BLOOD PRESSURE: 130 MMHG | WEIGHT: 136 LBS | BODY MASS INDEX: 25.03 KG/M2 | HEIGHT: 62 IN | TEMPERATURE: 98.1 F

## 2018-01-23 DIAGNOSIS — I10 ESSENTIAL (PRIMARY) HYPERTENSION: ICD-10-CM

## 2018-01-23 PROCEDURE — 85025 COMPLETE CBC W/AUTO DIFF WBC: CPT

## 2018-01-23 PROCEDURE — 36415 COLL VENOUS BLD VENIPUNCTURE: CPT

## 2018-01-23 PROCEDURE — 99215 OFFICE O/P EST HI 40 MIN: CPT | Mod: 25

## 2018-01-24 ENCOUNTER — RX RENEWAL (OUTPATIENT)
Age: 83
End: 2018-01-24

## 2018-01-25 ENCOUNTER — RX RENEWAL (OUTPATIENT)
Age: 83
End: 2018-01-25

## 2018-01-25 LAB
ALBUMIN SERPL ELPH-MCNC: 4.2 G/DL
ALP BLD-CCNC: 67 U/L
ALT SERPL-CCNC: 11 U/L
ANION GAP SERPL CALC-SCNC: 14 MMOL/L
AST SERPL-CCNC: 22 U/L
BILIRUB SERPL-MCNC: 0.4 MG/DL
BUN SERPL-MCNC: 15 MG/DL
CALCIUM SERPL-MCNC: 9.7 MG/DL
CEA SERPL-MCNC: 9.2 NG/ML
CHLORIDE SERPL-SCNC: 105 MMOL/L
CO2 SERPL-SCNC: 22 MMOL/L
CREAT SERPL-MCNC: 0.68 MG/DL
GLUCOSE SERPL-MCNC: 117 MG/DL
HCT VFR BLD CALC: 32 %
HGB BLD-MCNC: 11.3 G/DL
MCHC RBC-ENTMCNC: 32.4 PG
MCHC RBC-ENTMCNC: 35.3 GM/DL
MCV RBC AUTO: 91.7 FL
PLATELET # BLD AUTO: 177 K/UL
POTASSIUM SERPL-SCNC: 4.1 MMOL/L
PROT SERPL-MCNC: 7 G/DL
RBC # BLD: 3.49 M/UL
RBC # FLD: 22.2 %
SODIUM SERPL-SCNC: 141 MMOL/L
WBC # FLD AUTO: 5.4 K/UL

## 2018-02-16 ENCOUNTER — RX RENEWAL (OUTPATIENT)
Age: 83
End: 2018-02-16

## 2018-04-23 ENCOUNTER — APPOINTMENT (OUTPATIENT)
Dept: HEMATOLOGY ONCOLOGY | Facility: CLINIC | Age: 83
End: 2018-04-23
Payer: MEDICARE

## 2018-04-23 ENCOUNTER — LABORATORY RESULT (OUTPATIENT)
Age: 83
End: 2018-04-23

## 2018-04-23 VITALS
HEART RATE: 77 BPM | HEIGHT: 62 IN | SYSTOLIC BLOOD PRESSURE: 120 MMHG | DIASTOLIC BLOOD PRESSURE: 66 MMHG | WEIGHT: 142 LBS | TEMPERATURE: 97.7 F | BODY MASS INDEX: 26.13 KG/M2

## 2018-04-23 LAB
HCT VFR BLD CALC: 34.6 %
HGB BLD-MCNC: 12 G/DL
MCHC RBC-ENTMCNC: 32.8 PG
MCHC RBC-ENTMCNC: 34.7 GM/DL
MCV RBC AUTO: 94.4 FL
PLATELET # BLD AUTO: 142 K/UL
RBC # BLD: 3.66 M/UL
RBC # FLD: 15.9 %
WBC # FLD AUTO: 4.9 K/UL

## 2018-04-23 PROCEDURE — 99215 OFFICE O/P EST HI 40 MIN: CPT | Mod: 25

## 2018-04-23 PROCEDURE — 36415 COLL VENOUS BLD VENIPUNCTURE: CPT

## 2018-04-23 PROCEDURE — 85025 COMPLETE CBC W/AUTO DIFF WBC: CPT

## 2018-04-23 RX ORDER — TRAMADOL HYDROCHLORIDE 50 MG/1
50 TABLET, COATED ORAL
Qty: 90 | Refills: 0 | Status: ACTIVE | COMMUNITY
Start: 2018-04-23 | End: 1900-01-01

## 2018-04-23 RX ORDER — AMLODIPINE BESYLATE 10 MG/1
10 TABLET ORAL DAILY
Qty: 30 | Refills: 6 | Status: DISCONTINUED | COMMUNITY
Start: 2018-01-23 | End: 2018-04-23

## 2018-04-24 LAB
ALBUMIN SERPL ELPH-MCNC: 4.4 G/DL
ALP BLD-CCNC: 75 U/L
ALT SERPL-CCNC: 10 U/L
ANION GAP SERPL CALC-SCNC: 11 MMOL/L
AST SERPL-CCNC: 18 U/L
BILIRUB SERPL-MCNC: 0.4 MG/DL
BUN SERPL-MCNC: 14 MG/DL
CALCIUM SERPL-MCNC: 9.7 MG/DL
CEA SERPL-MCNC: 11.3 NG/ML
CHLORIDE SERPL-SCNC: 106 MMOL/L
CO2 SERPL-SCNC: 26 MMOL/L
CREAT SERPL-MCNC: 0.71 MG/DL
GLUCOSE SERPL-MCNC: 158 MG/DL
POTASSIUM SERPL-SCNC: 4 MMOL/L
PROT SERPL-MCNC: 6.8 G/DL
SODIUM SERPL-SCNC: 143 MMOL/L

## 2018-05-18 ENCOUNTER — APPOINTMENT (OUTPATIENT)
Dept: NUCLEAR MEDICINE | Facility: CLINIC | Age: 83
End: 2018-05-18
Payer: MEDICARE

## 2018-05-18 ENCOUNTER — OUTPATIENT (OUTPATIENT)
Dept: OUTPATIENT SERVICES | Facility: HOSPITAL | Age: 83
LOS: 1 days | End: 2018-05-18
Payer: COMMERCIAL

## 2018-05-18 DIAGNOSIS — Z98.890 OTHER SPECIFIED POSTPROCEDURAL STATES: Chronic | ICD-10-CM

## 2018-05-18 DIAGNOSIS — Z98.49 CATARACT EXTRACTION STATUS, UNSPECIFIED EYE: Chronic | ICD-10-CM

## 2018-05-18 DIAGNOSIS — C18.9 MALIGNANT NEOPLASM OF COLON, UNSPECIFIED: ICD-10-CM

## 2018-05-18 DIAGNOSIS — R93.3 ABNORMAL FINDINGS ON DIAGNOSTIC IMAGING OF OTHER PARTS OF DIGESTIVE TRACT: Chronic | ICD-10-CM

## 2018-05-18 DIAGNOSIS — Z97.5 PRESENCE OF (INTRAUTERINE) CONTRACEPTIVE DEVICE: Chronic | ICD-10-CM

## 2018-05-18 DIAGNOSIS — N63 UNSPECIFIED LUMP IN BREAST: Chronic | ICD-10-CM

## 2018-05-18 PROCEDURE — A9552: CPT

## 2018-05-18 PROCEDURE — 78815 PET IMAGE W/CT SKULL-THIGH: CPT | Mod: 26,PS

## 2018-05-18 PROCEDURE — 78815 PET IMAGE W/CT SKULL-THIGH: CPT

## 2018-07-23 ENCOUNTER — LABORATORY RESULT (OUTPATIENT)
Age: 83
End: 2018-07-23

## 2018-07-23 ENCOUNTER — APPOINTMENT (OUTPATIENT)
Dept: HEMATOLOGY ONCOLOGY | Facility: CLINIC | Age: 83
End: 2018-07-23
Payer: MEDICARE

## 2018-07-23 VITALS
BODY MASS INDEX: 25.21 KG/M2 | HEART RATE: 72 BPM | DIASTOLIC BLOOD PRESSURE: 82 MMHG | WEIGHT: 137 LBS | SYSTOLIC BLOOD PRESSURE: 170 MMHG | TEMPERATURE: 98.5 F | HEIGHT: 62 IN

## 2018-07-23 LAB
HCT VFR BLD CALC: 37.7 %
HGB BLD-MCNC: 13.1 G/DL
MCHC RBC-ENTMCNC: 29.6 PG
MCHC RBC-ENTMCNC: 34.6 GM/DL
MCV RBC AUTO: 85.4 FL
PLATELET # BLD AUTO: 145 K/UL
RBC # BLD: 4.41 M/UL
RBC # FLD: 12.8 %
WBC # FLD AUTO: 4.4 K/UL

## 2018-07-23 PROCEDURE — 36415 COLL VENOUS BLD VENIPUNCTURE: CPT

## 2018-07-23 PROCEDURE — 85025 COMPLETE CBC W/AUTO DIFF WBC: CPT

## 2018-07-23 PROCEDURE — 99215 OFFICE O/P EST HI 40 MIN: CPT | Mod: 25

## 2018-07-23 RX ORDER — GLUC/MSM/COLGN2/HYAL/ANTIARTH3 375-375-20
TABLET ORAL
Refills: 0 | Status: DISCONTINUED | COMMUNITY
End: 2018-07-23

## 2018-07-23 RX ORDER — FOLIC ACID 1 MG/1
1 TABLET ORAL DAILY
Refills: 0 | Status: DISCONTINUED | COMMUNITY
Start: 2018-01-23 | End: 2018-07-23

## 2018-07-23 RX ORDER — PANTOPRAZOLE 40 MG/1
40 TABLET, DELAYED RELEASE ORAL
Qty: 30 | Refills: 6 | Status: DISCONTINUED | COMMUNITY
Start: 2018-01-23 | End: 2018-07-23

## 2018-07-23 RX ORDER — CAPECITABINE 500 MG/1
500 TABLET ORAL
Qty: 56 | Refills: 5 | Status: DISCONTINUED | COMMUNITY
Start: 2017-10-27 | End: 2018-07-23

## 2018-07-23 RX ORDER — UREA 1 ML/10ML
10 LOTION TOPICAL TWICE DAILY
Qty: 1 | Refills: 3 | Status: DISCONTINUED | COMMUNITY
Start: 2017-11-20 | End: 2018-07-23

## 2018-07-23 RX ORDER — AMLODIPINE BESYLATE 10 MG/1
10 TABLET ORAL
Qty: 30 | Refills: 0 | Status: ACTIVE | COMMUNITY
Start: 2017-10-27 | End: 1900-01-01

## 2018-07-24 PROBLEM — C18.9 MALIGNANT NEOPLASM OF COLON, UNSPECIFIED: Chronic | Status: ACTIVE | Noted: 2017-09-18

## 2018-07-24 PROBLEM — D64.9 ANEMIA, UNSPECIFIED: Chronic | Status: ACTIVE | Noted: 2017-09-22

## 2018-07-24 PROBLEM — H40.9 UNSPECIFIED GLAUCOMA: Chronic | Status: ACTIVE | Noted: 2017-08-03

## 2018-07-24 PROBLEM — R03.0 ELEVATED BLOOD-PRESSURE READING, WITHOUT DIAGNOSIS OF HYPERTENSION: Chronic | Status: ACTIVE | Noted: 2017-09-18

## 2018-07-24 PROBLEM — K27.9 PEPTIC ULCER, SITE UNSPECIFIED, UNSPECIFIED AS ACUTE OR CHRONIC, WITHOUT HEMORRHAGE OR PERFORATION: Chronic | Status: ACTIVE | Noted: 2017-08-03

## 2018-07-24 PROBLEM — N81.10 CYSTOCELE, UNSPECIFIED: Chronic | Status: ACTIVE | Noted: 2017-08-03

## 2018-07-24 LAB
ALBUMIN SERPL ELPH-MCNC: 4.1 G/DL
ALP BLD-CCNC: 77 U/L
ALT SERPL-CCNC: 15 U/L
ANION GAP SERPL CALC-SCNC: 12 MMOL/L
AST SERPL-CCNC: 22 U/L
BILIRUB SERPL-MCNC: 0.5 MG/DL
BUN SERPL-MCNC: 13 MG/DL
CALCIUM SERPL-MCNC: 9.2 MG/DL
CEA SERPL-MCNC: 13.8 NG/ML
CHLORIDE SERPL-SCNC: 107 MMOL/L
CO2 SERPL-SCNC: 25 MMOL/L
CREAT SERPL-MCNC: 0.64 MG/DL
GLUCOSE SERPL-MCNC: 96 MG/DL
POTASSIUM SERPL-SCNC: 3.6 MMOL/L
PROT SERPL-MCNC: 6.5 G/DL
SODIUM SERPL-SCNC: 144 MMOL/L

## 2018-10-05 ENCOUNTER — APPOINTMENT (OUTPATIENT)
Dept: HEMATOLOGY ONCOLOGY | Facility: CLINIC | Age: 83
End: 2018-10-05

## 2018-10-15 ENCOUNTER — APPOINTMENT (OUTPATIENT)
Dept: HEMATOLOGY ONCOLOGY | Facility: CLINIC | Age: 83
End: 2018-10-15
Payer: MEDICARE

## 2018-10-15 ENCOUNTER — LABORATORY RESULT (OUTPATIENT)
Age: 83
End: 2018-10-15

## 2018-10-15 VITALS
HEIGHT: 62 IN | HEART RATE: 74 BPM | BODY MASS INDEX: 25.76 KG/M2 | WEIGHT: 140 LBS | TEMPERATURE: 98 F | DIASTOLIC BLOOD PRESSURE: 80 MMHG | SYSTOLIC BLOOD PRESSURE: 128 MMHG

## 2018-10-15 DIAGNOSIS — M79.673 PAIN IN UNSPECIFIED HAND: ICD-10-CM

## 2018-10-15 DIAGNOSIS — R97.0 ELEVATED CARCINOEMBRYONIC ANTIGEN [CEA]: ICD-10-CM

## 2018-10-15 DIAGNOSIS — Z79.899 OTHER LONG TERM (CURRENT) DRUG THERAPY: ICD-10-CM

## 2018-10-15 DIAGNOSIS — C18.9 MALIGNANT NEOPLASM OF COLON, UNSPECIFIED: ICD-10-CM

## 2018-10-15 DIAGNOSIS — M79.643 PAIN IN UNSPECIFIED HAND: ICD-10-CM

## 2018-10-15 LAB
HCT VFR BLD CALC: 38.3 %
HGB BLD-MCNC: 13.1 G/DL
MCHC RBC-ENTMCNC: 28.4 PG
MCHC RBC-ENTMCNC: 34.2 GM/DL
MCV RBC AUTO: 82.9 FL
PLATELET # BLD AUTO: 160 K/UL
RBC # BLD: 4.62 M/UL
RBC # FLD: 14.4 %
WBC # FLD AUTO: 5 K/UL

## 2018-10-15 PROCEDURE — 36415 COLL VENOUS BLD VENIPUNCTURE: CPT

## 2018-10-15 PROCEDURE — 99214 OFFICE O/P EST MOD 30 MIN: CPT | Mod: 25

## 2018-10-15 PROCEDURE — 85025 COMPLETE CBC W/AUTO DIFF WBC: CPT

## 2018-10-15 RX ORDER — VITS A,C,E/LUTEIN/MINERALS 300MCG-200
TABLET ORAL
Refills: 0 | Status: ACTIVE | COMMUNITY

## 2018-10-15 RX ORDER — ACETAMINOPHEN 325 MG
TABLET ORAL
Refills: 0 | Status: ACTIVE | COMMUNITY

## 2018-10-15 RX ORDER — CRANBERRY FRUIT EXTRACT 200 MG
CAPSULE ORAL
Refills: 0 | Status: ACTIVE | COMMUNITY

## 2018-10-15 RX ORDER — SENNOSIDES 8.6 MG
8.6 TABLET ORAL
Qty: 60 | Refills: 3 | Status: DISCONTINUED | COMMUNITY
Start: 2017-11-20 | End: 2018-10-15

## 2018-10-15 RX ORDER — FOLIC ACID 1 MG/1
1 TABLET ORAL DAILY
Qty: 30 | Refills: 6 | Status: DISCONTINUED | COMMUNITY
Start: 2018-01-23 | End: 2018-10-15

## 2018-10-16 LAB
ALBUMIN SERPL ELPH-MCNC: 4.6 G/DL
ALP BLD-CCNC: 92 U/L
ALT SERPL-CCNC: 15 U/L
ANION GAP SERPL CALC-SCNC: 17 MMOL/L
AST SERPL-CCNC: 21 U/L
BILIRUB SERPL-MCNC: 0.4 MG/DL
BUN SERPL-MCNC: 15 MG/DL
CALCIUM SERPL-MCNC: 10.1 MG/DL
CEA SERPL-MCNC: 9.7 NG/ML
CHLORIDE SERPL-SCNC: 105 MMOL/L
CO2 SERPL-SCNC: 23 MMOL/L
CREAT SERPL-MCNC: 0.67 MG/DL
GLUCOSE SERPL-MCNC: 123 MG/DL
POTASSIUM SERPL-SCNC: 4 MMOL/L
PROT SERPL-MCNC: 7.3 G/DL
SODIUM SERPL-SCNC: 145 MMOL/L

## 2018-11-27 NOTE — BRIEF OPERATIVE NOTE - PRE-OP DX
Colon tumor  09/22/2017    Active  Kai Mi Localized Dermabrasion With Wire Brush Text: The patient was draped in routine manner.  Localized dermabrasion using 3 x 17 mm wire brush was performed in routine manner to papillary dermis. This spot dermabrasion is being performed to complete skin cancer reconstruction. It also will eliminate the other sun damaged precancerous cells that are known to be part of the regional effect of a lifetime's worth of sun exposure. This localized dermabrasion is therapeutic and should not be considered cosmetic in any regard. Localized Dermabrasion Text: The patient was draped in routine manner.  Localized dermabrasion using 3 x 17 mm wire brush was performed in routine manner to papillary dermis. This spot dermabrasion is being performed to complete skin cancer reconstruction. It also will eliminate the other sun damaged precancerous cells that are known to be part of the regional effect of a lifetime's worth of sun exposure. This localized dermabrasion is therapeutic and should not be considered cosmetic in any regard.

## 2019-01-27 NOTE — PATIENT PROFILE ADULT. - CENTRAL VENOUS CATHETER
ALERT AND ORIENTED X4. SITTING UP IN BED. NO CHANGE. CONTINUE PLAN OF CARE AND
SAFETY PRECAUTIONS. no

## 2022-12-08 NOTE — PATIENT PROFILE ADULT. - TEACHING/LEARNING EDUCATIONAL LEVEL OTHER
Alert-The patient is alert, awake and responds to voice. The patient is oriented to time, place, and person. The triage nurse is able to obtain subjective information.
[facility]

## 2024-08-27 ENCOUNTER — APPOINTMENT (OUTPATIENT)
Dept: ORTHOPEDIC SURGERY | Facility: CLINIC | Age: 89
End: 2024-08-27
Payer: MEDICARE

## 2024-08-27 VITALS — BODY MASS INDEX: 22.26 KG/M2 | HEIGHT: 62 IN | WEIGHT: 121 LBS

## 2024-08-27 DIAGNOSIS — M17.11 UNILATERAL PRIMARY OSTEOARTHRITIS, RIGHT KNEE: ICD-10-CM

## 2024-08-27 PROCEDURE — 99204 OFFICE O/P NEW MOD 45 MIN: CPT | Mod: 25

## 2024-08-27 PROCEDURE — 20611 DRAIN/INJ JOINT/BURSA W/US: CPT | Mod: RT

## 2024-08-27 PROCEDURE — 73564 X-RAY EXAM KNEE 4 OR MORE: CPT | Mod: RT

## 2024-08-27 NOTE — IMAGING
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

## 2024-08-27 NOTE — HISTORY OF PRESENT ILLNESS
[7] : 7 [de-identified] : 8/27/24 RUPINDER NEGRETE  is a 89 year F here today for right knee pain.  Pain has been present for a few months to a year and is located around the knee cap.  Notes sensation of knee locking up. [] : no [FreeTextEntry5] : 89 y.o patient is here for right knee pain today. No injury reported. States her pain has been present for a few months- a year. No prior tx or images have been done.

## 2024-08-27 NOTE — PROCEDURE
[FreeTextEntry1] : R knee CSI [FreeTextEntry2] : pain [FreeTextEntry3] : Risks and benefits of the injection were discussed with the patient including infection, bleeding, allergic reactions, worsening of symptoms, and possible neurovascular damage.  They stated understanding and were agreeable to proceed.  An inferolateral portal was marked at the junction of the inferior and lateral borders of the patella and the R knee was sterilely prepped with betadine and ETOH.  The inferolateral portal was then used to inject 40 mg of Kenalog and 4 cc of Marcaine.  The injection site was cleaned with ETOH and a sterile band aid was applied.  Patient tolerated the procedure well without any apparent complications.  Counseled on concerning signs/symptoms after injection and to call office should any problems arise.

## 2024-08-27 NOTE — DISCUSSION/SUMMARY
[de-identified] : - We discussed their diagnosis and treatment options at length including the risks and benefits of both surgical and non-surgical options. - We will continue conservative treatment with activity modification, PT, icing, weight loss, and anti-inflammatory medications. - The patient was provided with a PT prescription to work on ROM, hip ER/abductors strengthening, quad/hamstring stretches and strengthening, and other exercises - The patient was advised to let pain guide the gradual advancement of activities. - We also discussed the possible of a corticosteroid injection in order to help decrease inflammation and pain within their knee - The risks, benefits, and alternatives to corticosteroid injection were reviewed with the patient and the elected to proceed. - Follow up as needed in 6 weeks to re-evaluate, if no improvement we spoke about possibility of viscosupplementation injections.     The patient's current medication management of their orthopedic diagnosis was reviewed today: (1) We discussed a comprehensive treatment plan that included possible pharmaceutical management involving the use of prescription strength medications including but not limited to options such as oral Naprosyn 500mg BID, once daily Meloxicam 15 mg, or 500-650 mg Tylenol versus over the counter oral medications and topical prescription NSAID Pennsaid vs over the counter Voltaren gel. (2) There is a moderate risk of morbidity with further treatment, especially from use of prescription strength medications and possible side effects of these medications which include upset stomach with oral medications, skin reactions to topical medications and cardiac/renal issues with long term use. (3) I recommended that the patient follow-up with their medical physician to discuss any significant specific potential issues with long term medication use such as interactions with current medications or with exacerbation of underlying medical comorbidities. (4) The benefits and risks associated with use of injectable, oral or topical, prescription and over the counter anti-inflammatory medications were discussed with the patient. The patient voiced understanding of the risks including but not limited to bleeding, stroke, kidney dysfunction, heart disease, and were referred to the black box warning label for further information.   Prior to appointment and during encounter with patient extensive medical records were reviewed including but not limited to, hospital records, out patient records, imaging results, and lab data. During this appointment the patient was examined, diagnoses were discussed and explained in a face to face manner. In addition extensive time was spent reviewing aforementioned diagnostic studies. Counseling including abnormal image results, differential diagnoses, treatment options, risk and benefits, lifestyle changes, current condition, and current medications was performed. Patient's comments, questions, and concerns were address and patient verbalized understanding.

## 2025-03-19 ENCOUNTER — APPOINTMENT (OUTPATIENT)
Dept: NUCLEAR MEDICINE | Facility: CLINIC | Age: 89
End: 2025-03-19
Payer: MEDICARE

## 2025-03-19 ENCOUNTER — OUTPATIENT (OUTPATIENT)
Dept: OUTPATIENT SERVICES | Facility: HOSPITAL | Age: 89
LOS: 1 days | End: 2025-03-19
Payer: COMMERCIAL

## 2025-03-19 DIAGNOSIS — Z98.49 CATARACT EXTRACTION STATUS, UNSPECIFIED EYE: Chronic | ICD-10-CM

## 2025-03-19 DIAGNOSIS — Z00.8 ENCOUNTER FOR OTHER GENERAL EXAMINATION: ICD-10-CM

## 2025-03-19 DIAGNOSIS — R93.3 ABNORMAL FINDINGS ON DIAGNOSTIC IMAGING OF OTHER PARTS OF DIGESTIVE TRACT: Chronic | ICD-10-CM

## 2025-03-19 DIAGNOSIS — Z97.5 PRESENCE OF (INTRAUTERINE) CONTRACEPTIVE DEVICE: Chronic | ICD-10-CM

## 2025-03-19 DIAGNOSIS — Z98.890 OTHER SPECIFIED POSTPROCEDURAL STATES: Chronic | ICD-10-CM

## 2025-03-19 DIAGNOSIS — N63 UNSPECIFIED LUMP IN BREAST: Chronic | ICD-10-CM

## 2025-03-19 PROCEDURE — 78816 PET IMAGE W/CT FULL BODY: CPT | Mod: 26,PI

## 2025-03-19 PROCEDURE — 78816 PET IMAGE W/CT FULL BODY: CPT

## 2025-03-19 PROCEDURE — A9552: CPT
